# Patient Record
Sex: FEMALE | Race: WHITE | NOT HISPANIC OR LATINO | Employment: FULL TIME | URBAN - METROPOLITAN AREA
[De-identification: names, ages, dates, MRNs, and addresses within clinical notes are randomized per-mention and may not be internally consistent; named-entity substitution may affect disease eponyms.]

---

## 2017-12-12 ENCOUNTER — OFFICE VISIT - HEALTHEAST (OUTPATIENT)
Dept: VASCULAR SURGERY | Facility: CLINIC | Age: 42
End: 2017-12-12

## 2017-12-12 DIAGNOSIS — I87.2 VENOUS INSUFFICIENCY: ICD-10-CM

## 2017-12-12 DIAGNOSIS — I83.899 SYMPTOMATIC VARICOSE VEINS: ICD-10-CM

## 2017-12-12 RX ORDER — SPIRONOLACTONE 50 MG/1
50 TABLET, FILM COATED ORAL
Status: SHIPPED | COMMUNITY
Start: 2017-09-29

## 2017-12-12 RX ORDER — LORATADINE 10 MG/1
10 TABLET ORAL
Status: SHIPPED | COMMUNITY
Start: 2017-10-25

## 2017-12-12 RX ORDER — MULTIVITAMIN WITH IRON
500 TABLET ORAL
Status: SHIPPED | COMMUNITY
Start: 2015-03-25

## 2017-12-12 RX ORDER — POLYETHYLENE GLYCOL 3350 17 G/17G
POWDER, FOR SOLUTION ORAL
Status: SHIPPED | COMMUNITY
Start: 2017-08-16

## 2017-12-12 RX ORDER — BUPROPION HYDROCHLORIDE 150 MG/1
150 TABLET ORAL
Status: SHIPPED | COMMUNITY
Start: 2017-09-29

## 2020-11-18 ENCOUNTER — COMMUNICATION - HEALTHEAST (OUTPATIENT)
Dept: VASCULAR SURGERY | Facility: CLINIC | Age: 45
End: 2020-11-18

## 2020-12-01 ENCOUNTER — COMMUNICATION - HEALTHEAST (OUTPATIENT)
Dept: VASCULAR SURGERY | Facility: CLINIC | Age: 45
End: 2020-12-01

## 2020-12-01 ENCOUNTER — OFFICE VISIT - HEALTHEAST (OUTPATIENT)
Dept: VASCULAR SURGERY | Facility: CLINIC | Age: 45
End: 2020-12-01

## 2020-12-01 DIAGNOSIS — I83.893 VARICOSE VEINS OF BILATERAL LOWER EXTREMITIES WITH OTHER COMPLICATIONS: ICD-10-CM

## 2020-12-01 RX ORDER — CLONAZEPAM 0.5 MG/1
0.5 TABLET ORAL
Status: SHIPPED | COMMUNITY
Start: 2020-09-30

## 2020-12-11 ENCOUNTER — RECORDS - HEALTHEAST (OUTPATIENT)
Dept: VASCULAR ULTRASOUND | Facility: CLINIC | Age: 45
End: 2020-12-11

## 2020-12-11 DIAGNOSIS — I83.893 VARICOSE VEINS OF BILATERAL LOWER EXTREMITIES WITH OTHER COMPLICATIONS: ICD-10-CM

## 2020-12-15 ENCOUNTER — OFFICE VISIT - HEALTHEAST (OUTPATIENT)
Dept: VASCULAR SURGERY | Facility: CLINIC | Age: 45
End: 2020-12-15

## 2020-12-15 DIAGNOSIS — I83.893 VARICOSE VEINS OF BILATERAL LOWER EXTREMITIES WITH OTHER COMPLICATIONS: ICD-10-CM

## 2021-01-06 ENCOUNTER — COMMUNICATION - HEALTHEAST (OUTPATIENT)
Dept: VASCULAR SURGERY | Facility: CLINIC | Age: 46
End: 2021-01-06

## 2021-02-24 ENCOUNTER — COMMUNICATION - HEALTHEAST (OUTPATIENT)
Dept: VASCULAR SURGERY | Facility: CLINIC | Age: 46
End: 2021-02-24

## 2021-03-03 ENCOUNTER — RECORDS - HEALTHEAST (OUTPATIENT)
Dept: ADMINISTRATIVE | Facility: OTHER | Age: 46
End: 2021-03-03

## 2021-03-03 ENCOUNTER — RECORDS - HEALTHEAST (OUTPATIENT)
Dept: VASCULAR ULTRASOUND | Facility: CLINIC | Age: 46
End: 2021-03-03

## 2021-03-03 DIAGNOSIS — I83.893 VARICOSE VEINS OF BILATERAL LOWER EXTREMITIES WITH OTHER COMPLICATIONS: ICD-10-CM

## 2021-03-05 ENCOUNTER — RECORDS - HEALTHEAST (OUTPATIENT)
Dept: VASCULAR ULTRASOUND | Facility: CLINIC | Age: 46
End: 2021-03-05

## 2021-03-05 DIAGNOSIS — I83.893 VARICOSE VEINS OF BILATERAL LOWER EXTREMITIES WITH OTHER COMPLICATIONS: ICD-10-CM

## 2021-03-29 ENCOUNTER — COMMUNICATION - HEALTHEAST (OUTPATIENT)
Dept: VASCULAR SURGERY | Facility: CLINIC | Age: 46
End: 2021-03-29

## 2021-05-25 ENCOUNTER — RECORDS - HEALTHEAST (OUTPATIENT)
Dept: ADMINISTRATIVE | Facility: CLINIC | Age: 46
End: 2021-05-25

## 2021-05-27 ENCOUNTER — RECORDS - HEALTHEAST (OUTPATIENT)
Dept: ADMINISTRATIVE | Facility: CLINIC | Age: 46
End: 2021-05-27

## 2021-05-28 ENCOUNTER — RECORDS - HEALTHEAST (OUTPATIENT)
Dept: ADMINISTRATIVE | Facility: CLINIC | Age: 46
End: 2021-05-28

## 2021-05-29 ENCOUNTER — RECORDS - HEALTHEAST (OUTPATIENT)
Dept: ADMINISTRATIVE | Facility: CLINIC | Age: 46
End: 2021-05-29

## 2021-05-30 ENCOUNTER — RECORDS - HEALTHEAST (OUTPATIENT)
Dept: ADMINISTRATIVE | Facility: CLINIC | Age: 46
End: 2021-05-30

## 2021-05-31 VITALS — WEIGHT: 293 LBS | BODY MASS INDEX: 47.91 KG/M2

## 2021-06-01 ENCOUNTER — RECORDS - HEALTHEAST (OUTPATIENT)
Dept: ADMINISTRATIVE | Facility: CLINIC | Age: 46
End: 2021-06-01

## 2021-06-01 ENCOUNTER — OFFICE VISIT - HEALTHEAST (OUTPATIENT)
Dept: VASCULAR SURGERY | Facility: CLINIC | Age: 46
End: 2021-06-01

## 2021-06-01 DIAGNOSIS — I83.893 VARICOSE VEINS OF BILATERAL LOWER EXTREMITIES WITH OTHER COMPLICATIONS: ICD-10-CM

## 2021-06-01 DIAGNOSIS — I87.2 VENOUS INSUFFICIENCY OF LEFT LEG: ICD-10-CM

## 2021-06-02 ENCOUNTER — RECORDS - HEALTHEAST (OUTPATIENT)
Dept: ADMINISTRATIVE | Facility: CLINIC | Age: 46
End: 2021-06-02

## 2021-06-13 NOTE — TELEPHONE ENCOUNTER
"Patient called in left message she is having \"really bad\" right calf pain. She is seeing Dr. Tovar on 12/1/20 and wants to know if he has any recommendations. She last saw him in 2017.    Discussed with vascular manager Lissa, called patient back and left message that the patient should contact her PCP right away to get the calf pain checked out. Asked her to call with any other questions.  "

## 2021-06-13 NOTE — PROGRESS NOTES
"Danielle Grant is being evaluated via a billable video visit.      The patient has been notified of following:     \"This video visit will be conducted via a call between you and your physician/provider. We have found that certain health care needs can be provided without the need for an in-person physical exam. This service lets us provide the care you need with a video conversation. If a prescription is necessary we can send it directly to your pharmacy. If lab work is needed we can place an order and you can make an appointment with our lab at a later time.    Video visits are billed at different rates depending on your insurance coverage. Please reach out to your insurance provider with any questions.    If during the course of the call the physician/provider feels a video visit is not appropriate, you will not be charged for this service.\"    Patient has given verbal consent to a Video visit? Yes  How would you like to obtain your AVS? AVS Preference: Mail a copy.  If dropped by the video visit, the video invitation should be sent to: Text to cell phone: [phone]  Will anyone else be joining your video visit? No        Video-Visit Details    Type of service:  Video Visit    Originating Location (pt. Location): Home    Distant Location (provider location):  [facility]     Platform used for Video Visit: Zhanna Hilario LPN      "

## 2021-06-13 NOTE — PROGRESS NOTES
"Danielle Grant is being evaluated via a billable video visit.      The patient has been notified of following:     \"This video visit will be conducted via a call between you and your physician/provider. We have found that certain health care needs can be provided without the need for an in-person physical exam. This service lets us provide the care you need with a video conversation. If a prescription is necessary we can send it directly to your pharmacy. If lab work is needed we can place an order and you can make an appointment with our lab at a later time.    Video visits are billed at different rates depending on your insurance coverage. Please reach out to your insurance provider with any questions.    If during the course of the call the physician/provider feels a video visit is not appropriate, you will not be charged for this service.\"    Patient has given verbal consent to a Video visit? Yes  How would you like to obtain your AVS? AVS Preference: Mail a copy.  If dropped by the video visit, the video invitation should be sent to: Text to cell phone: [phone]  Will anyone else be joining your video visit? No    Current symptoms: bilateral varicose veins, c/o cramping and swelling.    Compression socks: No        Video-Visit Details    Type of service:  Video Visit    Originating Location (pt. Location): Home    Distant Location (provider location):  [facility]     Platform used for Video Visit: En Hilario LPN    "

## 2021-06-13 NOTE — TELEPHONE ENCOUNTER
Patient is calling to set up her ultrasound.  Writer noticed the ultrasound and 3 month follow has been scheduled in March.  However patient thought she would have the ultrasound done sooner to find out the source of the pain.  She would like clarification on this.

## 2021-06-14 NOTE — PROGRESS NOTES
Bilateral toes turn red/blue/purple and cool to touch. Is not wearing compression. VV continue left leg.Plan get new compression and wear them.

## 2021-06-14 NOTE — TELEPHONE ENCOUNTER
Patient would like updated on where things stand with prior authorization for procedure with Dr. Tovar.

## 2021-06-14 NOTE — TELEPHONE ENCOUNTER
Pt called back with insurance information.     [employer]- [id number]. Group- [id number]  [facility][id number]. Group-[id number]

## 2021-06-14 NOTE — TELEPHONE ENCOUNTER
Called patient and left message to let her know we received insurance authorization today. She should be expecting a call from our schedulers within the next couple of days.

## 2021-06-14 NOTE — PROGRESS NOTES
HPI: Pt is here for follow up of a vein disease in the leg issues.  She states that she has some cool purple toes occasionally on both feet she notices this during the fall and winter.  She is had extensive venous disease and had closure in the past.  She has been wearing socks of lately comes in for counter reevaluation again today.  She does have symptoms of pain though she gets some kind of chafing on her medial thighs which she is also points out.  She is doing well. Her appetite is good, and bowel function regular.  No fevers or chills. Ambulating without problems.       /66  Pulse 86  Temp 97.6  F (36.4  C) (Oral)   Resp 14  Wt (!) 324 lb 6.4 oz (147.1 kg)  BMI 47.91 kg/m2      EXAM: This is a  42 y.o. WOMAN in no distress  GENERAL: Appears well  CHEST clear  CVS S1S2 NSR  ABDOMEN: Soft, non-tender.   EXT: warm, moves without difficulty, she has some to be chafing count of issues going on medial thighs probably from her legs rubbing against each other.  She has got great dopplerable pulses with triphasic pulses very heard today.  No large varicosities are noted she does have spider veins both lower legs.      Assessment/Plan:   1. Symptomatic varicose veins  I think that she should continue her socks to get a new prescription for her today she her old ones are worn out she has not been wearing them at all.  - Compression stockings    2. Venous insufficiency  Emergent arterial issues I do not think she has an original problem I think she has more of kind of a spasm issue we could put her on Norvasc but I think it has been of extreme for her I think more about wearing socks warm her feet up and get her better results stay active also discussed weight loss exercise.  - Compression stockings      Too Tovar MD  [facility] Department of Surgery

## 2021-06-15 NOTE — TELEPHONE ENCOUNTER
Spoke with patient regarding vein ablation procedure next week in [place]. Advised to bring a . Explained it is ok to eat and drink prior to procedure with exception of blood thinner. Verified patient's insurance [employer] primary/[employer] secondary . We will supply patient with a thigh high compression sock. We carry from size medium to extra large.patient will bring in her own compression garment If patient doesn't fit into them they will need to provide their own. Questions answered. Patient will call if any further questions.

## 2021-06-15 NOTE — PROGRESS NOTES
Venaseal    Pre-Procedure:  Admit  [x]Consent for Venaseal Ablation of the   []Right Saphenous Vein and/or branches  [x]Left Saphenous Vein and/or branches  Vitals  [x]Vital signs per routine    Nursing Orders  [x]Vein Mapping of  []R extremity  [x]L extremity  [x]Sterile Prep to extremity    Medications  []Diazepam (Valium) 5mg PO, May Repeat x 1 for anxiety, relaxtion.    Post-Procedure:  Admission  [x]Post Procedure care - call physician for status update  []Admit to inpatient - Please document reason for admission in chart    Interventions require inpatient services     High risk of deterioration due to comorbidities     High risk of deterioration due to nature of admitting diagnosis  Location:    Vitals  [x]Vital signs per routine    Nursing Orders  [x]Thigh High Compression Stocking 20-30mm or 30-40mm to  []R extremity  [x]L extremity  [x]Check insertion site for bleeding or hematoma.  If bleeding or hematoma occurs, apply pressure and notify MD    Discharge  [x]Discharge home if no complications arise.  [x]Give post Venaseal Ablation discharge instructions to patient.  [x]Follow up venous ultrasound 72-96 hours after procedure.  [x]Follow up appointment with MD at 2 weeks.  [x]Contact MD for further questions

## 2021-06-15 NOTE — PATIENT INSTRUCTIONS - HE
Home Care Instructions Following Radiofrequency Closure of the Greater Saphenous Vein (VNUS)    Dressing  Wear your compression for 48 hours continuously until your ultrasound after the procedure.  You can remove your stocking to shower in 24 hours but then reapply after.  Wear the stocking for two weeks after the procedure while you are up.    Activity  The day of the procedure make sure to walk around the house for a few minutes each hour until bedtime.  The day after the procedure you should advance activity as tolerated.  NO strenuous activities though for 2 weeks.  In general avoid prolonged standing in place and sitting with your legs down.  Both of these activities will cause blood to pool in your legs resulting in more swelling and discomfort.  Do not drive or operate heavy machinery for 24 hours after the procedure.  Do not take baths or use hot tubs or swimming pools until your incision site is healed.  Do not fly for the Next 3 weeks.    Post Procedure Ultrasound  You will need an ultrasound within 2-3 days following the closure procedure.  Please schedule an ultrasound if you have not already done so.    Post Procedure Signs and Symptoms  There can be a mild amount of bruising and numbness after this procedure.  This will typically take a few weeks to fade.  You may feel pain or tenderness in your inner thigh.  Mild pain medication such as Tylenol or Ibuprofen maybe helpful.  It is normal to have fluid leaking from the injection areas the day of the procedure and it may be blood tinged.    Call if you have  Fever greater than 100.8 degrees F.  Uncontrolled bleeding from the incision site.  Pain that is not relieved by rest or pain medication.  Shortness of breath    Follow -up  Please plan to see your surgeon in the office two weeks after your procedure  Call [phone] to schedule this appointment if one has not already been made    You will receive a phone call from our staff within 48 hours of  your procedure.  Please have these instruction near by so that any questions can be addressed at that time.  If you have any concerns before that time, please do not hesitate to call us al [phone] and ask to speak to a nurse.  We want you to have a smooth recovery.    For emergencies after 4:30pm Monday - Friday, holidays and weekends:  For Dr Too Tovar call [facility] at [phone]  [facility]  [place] [phone]

## 2021-06-16 NOTE — TELEPHONE ENCOUNTER
Spoke with patient aware ok to use knee high compression,and return to exercise. No flights for 3 weeks.

## 2021-06-16 NOTE — TELEPHONE ENCOUNTER
Pt has been rescheduled for a f/u in June with Dr. Tovar from an appt she missed 3/23/21. Pt would like nurse Engle to call her back regarding compression stockings and working out. Pt was informed nurse Engle is not in the office today. Pt stated she understood.

## 2021-06-17 NOTE — TELEPHONE ENCOUNTER
Telephone Encounter by Dennise Orozco at 12/1/2020  9:51 AM     Author: Dennise Orozco Service: -- Author Type: Patient Access    Filed: 12/1/2020  9:52 AM Encounter Date: 12/1/2020 Status: Signed    : Dennise Orozco (Patient Access)

## 2021-06-18 NOTE — PATIENT INSTRUCTIONS - HE
"Patient Instructions by Mariah Hilario LPN at 12/1/2020 10:00 AM     Author: Mariah Hilario LPN Service: -- Author Type: Licensed Nurse    Filed: 12/1/2020 10:06 AM Encounter Date: 12/1/2020 Status: Signed    : Mariah Hilario LPN (Licensed Nurse)       We are prescribing some compression stockings for you. I have included different suppliers that should help you get measured and fitting to ensure proper fitting socks. You should wear this socks as much as you can. It is especially important to wear them with long periods of sitting/standing, long car rides or if you will be flying. Compression socks should get refilled every 4-6 months. They do not need to be worn at night while in bed.    If you do a lot of standing it is good to do calf raises to help keep the blood pumping. If you sit a lot at work it is good to get up periodically to walk around. Elevation of the foot of your bed 4-6\" helps the blood return back to where it is needed.    Varicose Veins      Varicose veins are swollen, enlarged veins most often found in the legs. They are usually blue or purple in color and may bulge, twist, and stand out under the skin.  Normally, veins return blood from the body to the heart. The leg veins have one-way valves that prevent blood from flowing backward in the vein. When the valves are weak or damaged, blood backs up in the veins. This may cause some of the veins to swell and bulge and become varicose veins.  Symptoms  Varicose veins may or may not cause symptoms. If symptoms do occur, they can include:    Legs that feel tired, achy, heavy, or itchy    Leg muscle cramps    Skin changes, such as discoloration, dryness, redness, or rash (in more severe cases, you may also have sores on the skin called venous leg ulcers)  Risk Factors  There are a number of factors that increase the risk for varicose veins. These can include:    Being a woman    Being older    Sitting or standing for long " periods    Being overweight    Being pregnant    Having a family history of varicose veins  Treatment begins with simple self-help measures (see below). If these dont help, there are many procedures that can be done to shrink or remove varicose veins. Your healthcare provider can tell you more about these options, if needed.  Home care    Support or compression stockings will likely be prescribed. If so, be sure to wear them as directed. They may help improve blood flow.    Exercising helps strengthen your leg muscles and improve blood flow. To get the most benefit, choose exercises such as walking, swimming, or cycling. Also try to exercise for at least 30 minutes on most days.    Raising (elevating) your legs lets gravity help blood flow back to the heart. Sit or lie with your feet above heart level a few times throughout the day, or as directed.    Avoid long periods of sitting or standing. Change positions often. Also, move your ankles, toes and knees often. This may also help improve blood flow.    If you are overweight, talk with your healthcare provider about setting up a weight-loss plan. Maintaining a healthy weight can help reduce the strain on your veins. It may also improve symptoms, such as swelling and aching.    If you have dryness and itching, ask your provider about special lotions that can be applied to the skin to help improve symptoms.  Follow-up care  Follow up with your healthcare provider, or as directed. If imaging tests were done, youll be told the results and if there are any new findings that affect your care.  When to seek medical advice  Call your healthcare provider right away if any of these occur:    Sudden, severe leg swelling, pain, or redness    Symptoms worsen, or they dont improve with self-care    Bleeding from any affected veins    Ulcers form on the legs, ankles, or feet    Fever of 100.4 F (38 C) or higher, or as advised by your provider      Understanding Spider and Varicose  Veins  Do you often hide your legs because of the way they look? You may have noticed tiny red or blue bursts (spider veins). Or maybe you have veins that bulge or look twisted (varicose veins). If so, there are treatments that can help  What are the symptoms?  Spider veins or varicose veins may never be a problem. But sometimes they can cause legs to ache or swell. Your legs may also feel heavy and tired, or like theyre burning. These symptoms may be more severe at the end of the day. Prolonged sitting or standing can also make your symptoms worse.  Who gets spider and varicose veins?  Anyone can get spider or varicose veins. But vein problems tend to be hereditary (run in families). Other factors that can affect veins include:    Pregnancy, hormones, and birth control pills    A job where you stand or sit a lot    Extra weight or lack of exercise    Age         Spider veins look like tiny webs on the ankles, legs, and upper thighs.       Ropy, dark blue, red, or flesh-colored varicose veins are most common on the thighs, calves, and feet.    What can be done?  Spider and varicose veins can affect the way you feel about yourself. Talk to your healthcare provider about your concerns. There are treatments that can ease symptoms and make your legs look better.  Your treatment choices  Treatment may include self-care, sclerotherapy (injecting veins with a chemical), surgery, or newer nonsurgical minimally invasive therapies. Spider veins and some varicose veins can be treated with sclerotherapy. Large varicose veins can often be treated with newer minimally invasive procedures and, in rare cases, surgery may be needed.     Please call [facility] to schedule an appointment. If you received a prescription please bring it with you to your appointment. You may call one of the locations below, although some locations are limited to what they carry.    Office Locations  New Locations  [facility]  [place]  [facility] Medical Equipment  [address], [place] [address]  [facility] (Formerly named [facility])  [address], [place] [address]  Phone [phone] /Fax [phone]        [place]   [address]   Medical Equipment [address]/Orthotics and Prosthetics [address]  [place] [address]   Phone: [phone]  Fax [phone]    [facility]  [address]  [address]  [place] [address]  Phone: [phone]  Fax: [phone]    [facility] Bldg[facility]   [address]  [place] [address]  Phone: [phone]  Fax: [phone]    [facility] Bldg[facility]  [address]  [place] [address]  Phone: [phone]  Fax: [phone]    [facility]  [address]  [address]  [place] [address]  Phone: [phone]  Fax: [phone]    [facility] [employer]  [address]   [place] [address]   Phone: [phone]  Fax: [phone]    [place]   [address] [place] [address]   Phone: [phone]  Fax: [phone]    [facility]  [address]  [place] [address]    [place] [phone]  [phone]  [place] [phone]  [email or web address]      [facility]   [address]             [address]                 [address]     [place] [address]      [place] [address]         [place] [address]  [phone]  [phone]     [phone]               Fax: [phone]  [email or web address]                                                     [facility]  [address] [place]  [address]  [phone]  [phone]  [email or web address]    [place] [facility]  [phone]  [email or web address]    [facility]   [phone]    [place] [address] [place] [address] [phone]

## 2021-06-18 NOTE — PATIENT INSTRUCTIONS - HE
Patient Instructions by Too Tovar MD at 12/15/2020  9:20 AM     Author: Too Tovar MD Service: -- Author Type: Physician    Filed: 12/15/2020  9:45 AM Encounter Date: 12/15/2020 Status: Signed    : Too Tovar MD (Physician)         Varicose Vein Pre-Procedure Instructions/Vein Ablation    You are scheduled for a varicose vein treatment on your legs. The following is some helpful information for you, in regards to your treatment.    **Important:  A  will be needed post procedure.  (Unable to use Taxi or Uber).     We will supply a thigh high compression stocking for you. Please bring your compression sock as we only have sizes medium to X-large.    Please be aware, it is not advised to fly within 3 weeks post procedure    Please wear comfortable clothing.  We recommend that you bring a change of under clothes; they may get stained by the cleansing solution.    Feel free to bring a personal music player or a CD to listen to during your procedure.    Take your routine medications as you normally would with exception to blood thinners. Aspirin is ok to continue.    If you take Warfarin, Xarelto, or Eliquis this will need to be HELD prior to procedure according to primary care provider/doctor or cardiology who prescribes this medication.    Please notify us if you take this medication.    It is ok to eat prior to this procedure.    Please allow 1- 2 hours for your appointment.    For any questions regarding your procedure please call   [phone] to speak with the nurse.    If you would like a Good Lucretia Estimate for your upcoming service/procedure contact Cost of Care Estimates at [phone], advocates are available Monday through Friday 8am - 5pm.    VenaSeal Ablation Codes  75264 for first vein in either leg  36186 for the second vein in the same leg    Please have the following information available:  1. Patient name and date of birth  2. Insurance company, plan name, ID and  group numbers  3. Description of the service/procedure and the associated procedure code numbers, if available. If more than one (1) procedure code, indicate which will be the primary procedure code.   4. The facility where the service/procedure will be performed.  5. The name of the physician involved with the service/procedure.  6. Appointment date of service.  7. Telephone number to call with the information.  Varicose Veins    UNDERSTAND  Varicose veins may be a sign of something more severe-venous reflux disease.  Healthy leg veins have valves that keep blood flowing to the heart. Venous reflux disease develops when the valves stop working properly and allow blood to flow backward (i.e., reflux) and pool in the lower leg veins.     If venous reflux disease is left untreated, symptoms can worsen over time. Your doctor can help you understand if you have this condition.     Superficial venous reflux disease may cause the following signs and symptoms in your legs:  Varicose veins  Aching  Swelling  Cramping  Heaviness or tiredness  Itching  Restlessness  Open skin sores    Treat  Superficial venous reflux disease treatment aims to reduce or stop the backward flow of blood. The following may be prescribed to treat your superficial venous reflux disease. Your doctor can help you decide which treatment is best for you:       Compression stockings     Removing diseased vein     Closing diseased vein (through thermal or non-thermal treatment)     VenaSeal? Closure System  One non-thermal treatment option is the VenaSeal? Closure System, which improves blood flow and relieves symptoms by sealing-or closing-the diseased vein. The system delivers a small amount of a specially formulated medical adhesive to the diseased vein. The adhesive permanently seals the vein and blood is rerouted through nearby healthy veins.          Demonstrated Outcomes  The VenaSeal? closure system is a safe and effective treatment, providing  significant improvements in quality of life.1,2,3    In a [place] study , the VenaSeal? system and thermal radiofrequency ablation treatments had similar clinical results at 3 years; 94.4% closure for the VenaSeal? system and 91.9% for thermal energy.     Side effects were minor and infrequent.     The most common side effect was phlebitis (i.e., inflammation of a vein), and it typically occurred within the first 30 days after the procedure. Phlebitis is a commonly reported side effect in all vein treatments. Phlebitis occurred more frequently in VenaSeal? system-treated subjects than in RFA-treated subjects, though the difference was not statistically significant.     Please see the Potential risks section for more information.    FAQ  What can I expect of the VenaSeal? procedure?    Before the Procedure:  You will have an ultrasound imaging exam of the leg that is to be treated. This exam is important for assessing the diseased superficial vein and planning the procedure.    During the Procedure:  Your doctor can discuss the procedure with you. A brief summary of what to expect is below:  You may feel some minor pain or stinging with a needle stick to numb the site where the doctor will access your vein.  Once the area is numb, your doctor will insert the catheter (i.e., a small hollow tube) into your leg. You may feel some pressure from the placement of the catheter.  The catheter will be placed in specific areas along the diseased vein to deliver small amounts of the medical adhesive. You may feel some mild sensation of pulling. Ultrasound will be used during the procedure to guide and position the catheter.  After treatment, the catheter is removed and a small adhesive bandage placed over the puncture site.         After the Procedure:  You will be taken to the recovery area to rest.  Your doctor will recommend follow-up care as needed.    Commonly Asked Questions  When will my symptoms improve?  Symptoms are caused  by the diseased superficial vein. Thus, symptoms may improve as soon as the diseased vein is closed.  When can I return to normal activity?  The VenaSeal procedure is designed to reduce recovery time. Many patients return to normal activity immediately after the procedure. Your doctor can help you determine when you can return to normal activity.  Is the VenaSeal procedure painful?  Most patients feel little, if any, pain during the outpatient procedure.1  Is there bruising after the VenaSeal? procedure?  Most patients report [id number] bruising after the VenaSeal procedure.1  What happens to the VenaSeal? adhesive?  Only a very small amount of VenaSeal? adhesive is used to close the vein. Your body will naturally create scar tissue around the adhesive over time to keep the vessel permanently closed.  How does the VenaSeal? procedure differ from thermal energy procedures?  The VenaSeal? procedure uses an adhesive to close the superficial vein. Thermal energy procedures use heat to close the vein. The intense heat requires a large volume of numbing medicine, which is injected through many needle sticks. The injections may cause pain and bruising after the procedure.  Is the VenaSeal procedure covered by insurance?  As with any procedure, insurance coverage may vary. For more information, please contact your insurance provider.    The VenaSeal? procedure may not be right for everyone  Your doctor can help you decide if the VenaSeal? procedure is right for you. The VenaSeal? procedure is contraindicated for individuals with any of the following conditions:  Thrombophlebitis migraines (i.e., inflammation of a vein caused by a slow moving blood clot)  Acute superficial thrombophlebitis (i.e., inflammation of a vein caused by a blood clot)  Previous hypersensitivity reactions to the VenaSeal? adhesive or cyanoacrylates  Acute sepsis (i.e., whole-body inflammation caused by an immune response to an  infection)    Potential risks  The VenaSeal? procedure is minimally invasive and catheter-based. As such, it may involve the following risks. Your doctor can help you understand these risks.  Allergic reaction to the VenaSeal? adhesive  Arteriovenous fistula (i.e., an abnormal connection between an artery and a vein)  Bleeding from the access site  Deep vein thrombosis (i.e., blood clot in the deep vein system)  Edema (i.e., swelling) in the treated leg  Embolization (i.e., blockage of a vein or artery), including pulmonary embolism (i.e., blockage of an artery in the lungs)  Hematoma (i.e., the collection of blood outside of a vessel)  Hyperpigmentation (i.e., darkening of the skin)  Infection at the access site  Non-specific mild inflammation of the cutaneous and subcutaneous tissue  Pain  Paresthesia (i.e., a feeling of tingling, pricking, numbness or burning)  Phlebitis (i.e., inflammation of a vein)  Superficial thrombophlebitis (i.e., inflammation of a vein caused by a blood clot)  Urticaria (i.e., hives) or ulceration may occur at the site of injection  Vascular rupture and perforation  Visible scarring

## 2021-06-21 NOTE — LETTER
Letter by Too Tovar MD at      Author: Too Tovar MD Service: -- Author Type: --    Filed:  Encounter Date: 12/1/2020 Status: (Other)         Yemi Lozoya MD  [address] [facility] [address]                                  December 1, 2020    Patient: Danielle Grant   MR Number: [id number]   YOB: 1975   Date of Visit: 12/1/2020     Dear Dr. Devora MD:    Thank you for referring Danielle Grant to me for evaluation. Below are the relevant portions of my assessment and plan of care.    If you have questions, please do not hesitate to call me. I look forward to following Danielle along with you.    Sincerely,        Too Tovar MD            Too Montgomery MD  12/1/2020  1:42 PM  Sign when Signing Visit      [facility] Vein Consult  Video Visit  Start: 10:04  End: 10:19      Assessment:     1. varicose veins, bilateral   2. spider veins, bilateral   3. Leg pain and swelling bilateral    Plan:     1. Treatment options of conservative therapy of stockings use, exercise, weight loss, elevating legs when possible.    2. Script for compression stockings 20-30 mm hg  3. Ultrasound to evaluate legs for incompetency of both deep and superficial system .   4. Surgical treatment, discussed briefly  5. Follow up: after ultrasound virtual or in person or tlelphone.   6. Call for any questions concerns or issues    Subjective:      Danielle Grant is a 45 y.o. female  who was referred by Yemi Lozoya MD  for evaluation of varicose veins. Symptoms include pain, aching, fatigue, burning, edema and dermatitis. Patient has history of leg selling, pain and vein issues that have progressed. Pain and symptoms have affected daily living and work activities needing medications. Here for evaluation today. no stocking or compression devic use    Allergies:Gluten, Pepper (genus capsicum), Strawberry, and Latex    Past Medical History:    Diagnosis Date   ? Allergic Rhinitis     Created by Conversion    ? Anxiety    ? Asthma    ? Excessive Facial Or Body Hair (Hirsutism)     Created by Conversion    ? Foot Pain (Soft Tissue)     Created by Conversion    ? Ganglion     Created by Conversion    ? Hypertension     Created by Conversion    ? Insomnia     Created by Conversion    ? Major Depression, Recurrent     Created by Conversion    ? Migraine Headache     Created by Conversion    ? Nephrolithiasis     Created by Conversion    ? Obesity     Created by Conversion    ? Obstructive Sleep Apnea     Created by Conversion    ? Oligomenorrhea     Created by Conversion    ? Seborrheic Dermatitis     Created by Conversion    ? Sleep apnea    ? Type 2 Diabetes Mellitus     Created by Conversion    ? Vitamin D Deficiency     Created by Conversion        Past Surgical History:   Procedure Laterality Date   ? HYSTERECTOMY Bilateral 2016    Procedure: TOTAL LAPAROSCOPIC HYSTERECTOMY BILATERAL SALPINGECTOMY AND CYSTOSCOPY;  Surgeon: Juliana Santana MD;  Location: [facility] Main OR;  Service:    ? NC  DELIVERY ONLY      Description:  Section;  Recorded: 2008;   ? NC LIGATE FALLOPIAN TUBE      Description: Tubal Ligation;  Recorded: 2008;   ? NC TOTAL ABDOM HYSTERECTOMY N/A 2016    Procedure: ABDOMINAL SUBTOTAL  HYSTERECTOMY, ATTEMPTED LAPAROSCOPIC and MIRENA REMOVAL;  Surgeon: Juliana Santana MD;  Location: [facility];  Service: Gynecology       Current Outpatient Medications   Medication Sig Note   ? amitriptyline (ELAVIL) 150 MG tablet Take 150 mg by mouth bedtime.  2015: Received from: External Pharmacy   ? aspirin 81 mg chewable tablet Chew 81 mg daily.    ? buPROPion (WELLBUTRIN XL) 150 MG 24 hr tablet Take 150 mg by mouth. 2017: Received from: [facility] Received Sig: Take 1 tablet by mouth once daily.   ? cholecalciferol, vitamin D3, (VITAMIN D3) 1,000 unit capsule  Take 2,000 Units by mouth bedtime.     ? clonazePAM (KLONOPIN) 0.5 MG tablet Take 0.5 mg by mouth.    ? CONTOUR NEXT STRIPS Strp TEST BLOOD SUGARS 3 TIMES DAILY    ? dulaglutide (TRULICITY) 1.5 mg/0.5 mL PnIj Inject 1.5 mg under the skin. 12/12/2017: Received from: [facility] Received Sig: Inject 1.5 mg subcutaneous once weekly.   ? DULoxetine (CYMBALTA) 20 MG capsule Take 60 mg by mouth bedtime.     ? EASY TOUCH TWIST LANCETS 30 gauge Misc  1/14/2015: Received from: External Pharmacy   ? eletriptan (RELPAX) 20 MG tablet Take 2 tablets (40 mg total) by mouth as needed for migraine. may repeat in 2 hours if necessary    ? fluticasone (FLONASE) 50 mcg/actuation nasal spray 1 spray into each nostril. 12/12/2017: Received from: [facility] Received Sig: Inhale 1 Spray in the nostril(s) once daily.   ? loratadine (CLARITIN) 10 mg tablet Take 10 mg by mouth. 12/12/2017: Received from: [facility] Received Sig: Take 1 tablet by mouth once a day   ? magnesium 250 mg Tab Take 500 mg by mouth. 12/12/2017: [facility] Received Sig: Take 2 tablets by mouth once daily.   ? magnesium oxide 250 mg Tab Take 250 mg by mouth bedtime.     ? metFORMIN (GLUCOPHAGE-XR) 500 MG 24 hr tablet Take 2,000 mg by mouth bedtime.    ? montelukast (SINGULAIR) 10 mg tablet Take 10 mg by mouth bedtime.    ? orphenadrine (NORFLEX) 100 mg tablet Take 100 mg by mouth bedtime as needed for muscle spasms.    ? polyethylene glycol (GLYCOLAX) 17 gram/dose powder  12/12/2017: Received from: [facility]   ? senna-docusate (PERICOLACE) 8.6-50 mg tablet Take 1 tablet by mouth 2 (two) times a day.    ? spironolactone (ALDACTONE) 50 MG tablet Take 50 mg by mouth. 12/12/2017: Received from: [facility] Received Sig: Take 1 tablet by mouth 2 times daily.        Family History   Problem Relation Age of Onset   ? Varicose Veins Mother         reports that she has never smoked. She does not have any smokeless tobacco history on file. She reports current alcohol use. She reports that she does not use drugs.      Review of Systems:  Pertinent items are noted in HPI.  A 12 point comprehensive review of systems was negative except as noted.   Patient has symptomatic veins and changes of bilateral legs. These have progressed to the point of causing symptoms on a daily basis. This causes issues with daily activities and chores such as washing dishes, vacuuming, outdoor upkeep and standing for long lengths of time       Objective:     There were no vitals filed for this visit.  There is no height or weight on file to calculate BMI.    EXAM:  GENERAL: This is a well-developed 45 y.o. female who appears her stated age  HEAD: normocephalic  VASCULAR: Pulses intact, symmetrical upper and lower extremities. There areskin changes consistent with chronic venous insufficiency. Varicose veins present in bilateral greater saphenous distribution. Spider veins present bilateral   From photos sent in.    The rest of the exam not done secondary to covid n19 restrictions.                              Imaging:    pending    Too Tovar MD  General Surgery [phone]  Vascular Surgery [phone]

## 2021-06-21 NOTE — LETTER
Letter by Too Tovar MD at      Author: Too Tovar MD Service: -- Author Type: --    Filed:  Encounter Date: 12/15/2020 Status: (Other)         Yemi Lozoya MD  [address] [facility] [address]                                  December 28, 2020    Patient: Danielle Grant   MR Number: [id number]   YOB: 1975   Date of Visit: 12/15/2020     Dear Dr. Devora MD:    Thank you for referring Danielle Grant to me for evaluation. Below are the relevant portions of my assessment and plan of care.    If you have questions, please do not hesitate to call me. I look forward to following Danielle along with you.    Sincerely,        Too Tovar MD            Too Montgomery MD  12/28/2020  1:04 PM  Sign when Signing Visit  Follow Up: Varicose Veins/ Venous Insufficiency    Danielle Grant is a 45 y.o.  female here for followup. She has worn stockings now for 3 months. I saw them previously in December 2020.  Continued progression of disease and symptoms and issues; reviewed ultrasound results. Patient has ongoing symptoms with pain and swelling needing intervention with pain meds secondary to interfering with daily activities and work. This now inhibits daily chores and activities.     Allergies:Gluten, Pepper (genus capsicum), Strawberry, and Latex    Past Medical History:   Diagnosis Date   ? Allergic Rhinitis     Created by Conversion    ? Anxiety    ? Asthma    ? Excessive Facial Or Body Hair (Hirsutism)     Created by Conversion    ? Foot Pain (Soft Tissue)     Created by Conversion    ? Ganglion     Created by Conversion    ? Hypertension     Created by Conversion    ? Insomnia     Created by Conversion    ? Major Depression, Recurrent     Created by Conversion    ? Migraine Headache     Created by Conversion    ? Nephrolithiasis     Created by Conversion    ? Obesity     Created by Conversion    ? Obstructive Sleep Apnea     Created by  Conversion    ? Oligomenorrhea     Created by Conversion    ? Seborrheic Dermatitis     Created by Conversion    ? Sleep apnea    ? Type 2 Diabetes Mellitus     Created by Conversion    ? Vitamin D Deficiency     Created by Conversion        Past Surgical History:   Procedure Laterality Date   ? HYSTERECTOMY Bilateral 2016    Procedure: TOTAL LAPAROSCOPIC HYSTERECTOMY BILATERAL SALPINGECTOMY AND CYSTOSCOPY;  Surgeon: Juliana Santana MD;  Location: [facility];  Service:    ? [place]  DELIVERY ONLY      Description:  Section;  Recorded: 2008;   ? [place] LIGATE FALLOPIAN TUBE      Description: Tubal Ligation;  Recorded: 2008;   ? [place] TOTAL ABDOM HYSTERECTOMY N/A 2016    Procedure: ABDOMINAL SUBTOTAL  HYSTERECTOMY, ATTEMPTED LAPAROSCOPIC and MIRENA REMOVAL;  Surgeon: Juliana Santana MD;  Location: [facility] OR;  Service: Gynecology       CURRENT MEDS:  Current Outpatient Medications on File Prior to Visit   Medication Sig Dispense Refill   ? amitriptyline (ELAVIL) 150 MG tablet Take 150 mg by mouth bedtime.      ? aspirin 81 mg chewable tablet Chew 81 mg daily.     ? buPROPion (WELLBUTRIN XL) 150 MG 24 hr tablet Take 150 mg by mouth.     ? cholecalciferol, vitamin D3, (VITAMIN D3) 1,000 unit capsule Take 2,000 Units by mouth bedtime.      ? clonazePAM (KLONOPIN) 0.5 MG tablet Take 0.5 mg by mouth.     ? CONTOUR NEXT STRIPS Strp TEST BLOOD SUGARS 3 TIMES DAILY 100 strip PRN   ? dulaglutide (TRULICITY) 1.5 mg/0.5 mL PnIj Inject 1.5 mg under the skin.     ? DULoxetine (CYMBALTA) 20 MG capsule Take 60 mg by mouth bedtime.      ? EASY TOUCH TWIST LANCETS 30 gauge Misc      ? eletriptan (RELPAX) 20 MG tablet Take 2 tablets (40 mg total) by mouth as needed for migraine. may repeat in 2 hours if necessary 10 tablet 1   ? fluticasone (FLONASE) 50 mcg/actuation nasal spray 1 spray into each nostril.     ? loratadine (CLARITIN) 10 mg tablet Take 10 mg by mouth.     ? magnesium 250 mg Tab  Take 500 mg by mouth.     ? magnesium oxide 250 mg Tab Take 250 mg by mouth bedtime.      ? metFORMIN (GLUCOPHAGE-XR) 500 MG 24 hr tablet Take 2,000 mg by mouth bedtime.     ? montelukast (SINGULAIR) 10 mg tablet Take 10 mg by mouth bedtime.     ? orphenadrine (NORFLEX) 100 mg tablet Take 100 mg by mouth bedtime as needed for muscle spasms.     ? polyethylene glycol (GLYCOLAX) 17 gram/dose powder      ? senna-docusate (PERICOLACE) 8.6-50 mg tablet Take 1 tablet by mouth 2 (two) times a day. 30 tablet 0   ? spironolactone (ALDACTONE) 50 MG tablet Take 50 mg by mouth.       No current facility-administered medications on file prior to visit.        Family History   Problem Relation Age of Onset   ? Varicose Veins Mother         reports that she has never smoked. She does not have any smokeless tobacco history on file. She reports current alcohol use. She reports that she does not use drugs.    Review of Systems:  Negative except continued symptoms and aissues  Patient has symptomatic veins and changes of bilateral legs. These have progressed to the point of causing symptoms on a daily basis. This causes issues with daily activities and chores such as washing dishes, vacuuming, outdoor upkeep and standing for long lengths of time. She has worn compression now for greater than 3 months, worked on an exercise and weight loss program and continues to have symptoms.     OBJECTIVE:  There were no vitals filed for this visit.  There is no height or weight on file to calculate BMI.    EXAM:  GENERAL: This is a well-developed 45 y.o. female who appears her stated age  HEAD: normocephalic    The rest of the exam not done secondary to video visit and covid n19 restrictions.                  Imaging:    US Venous Insufficiency Legs Bilateral (Order [id number])  Imaging  Date: 12/11/2020 Department: [facility] [place] Released By: Ida Melendez Authorizing: Too Tovar MD   Study  Result    BILATERAL Venous Insufficiency Ultrasound     BILATERAL Lower Extremity          Indication:  Surveillance of bilateral leg pain vv and swelling, HX of Right GSV 2015        Patient History: Swelling, Stasis, Vein Stripping and Morbid Obesity     Presenting Symptoms:  Swelling, Varicose Veins and Pain     Technique:   Supine and Upright Ultrasound of the Deep and Superficial Veins with Valsalva and Compression Augmentation Maneuvers. Duplex Imaging is performed utilizing gray-scale, Two-dimensional images, color-flow imaging, Doppler waveform analysis, and Spectral doppler imaging done with provacative maneuvers.      Incompetency Criteria:  Deep vein reflux reported when greater than 1000 msec flow reversal. Superficial vein reflux reported when equal to or greater than 600 msec flow reversal.  vein reflux reported as greater than 350 msec flow reversal.      Right  Leg Deep Veins                 CFV SFJ PFV PROX SFV   PROX SFV MID SFV DIST POP V. PERON.   V. PTV'S   Compressibility  (FC,PC,NC) FC FC FC FC FC FC FC FC FC   Spontaneous +   + + + + +   +   Phasic  +   + + + + +   +   Augmentation +   + + + + +   +   Reflux -   - + - - -   -         Right Leg Saphenous Veins  Location SFJ PROX THIGH KNEE MID CALF SPJ PROX CALF MID CALF   RT GSV (mm) 7 RFA RFA RFA         Reflux - - - -         RT SSV (mm)         1 1 1   Reflux         - - -         Left Leg Deep Veins    CFV SFJ PFV PROX SFV PROX SFV MID SFV DIST POP V. PERON. V. PTV'S   Compressibility  (FC,PC,NC) FC FC FC FC FC FC FC FC FC   Spontaneous +   + + + + +   +   Phasic  +   + + + + +   +   Augmentation +   + + + + +   +   Reflux -   - - - - -   -         Lt Leg Saphenous Veins   Location SFJ PROX THIGH KNEE MID CALF SPJ PROX CALF MID CALF   LT GSV (mm) 9 4 4 3         Reflux + + - +         LT SSV (mm)         5 5 4   Reflux         - - -         Compression Study:  Normal           Impression:       Incompetent left great saphenous  vein in the proximal thigh and the level of saphenous femoral junction.  Otherwise, the rest of the deep and superficial venous systems is competent and patent.  No evidence of DVT bilaterally.     Reference:     Compressibility: FC= Fully compressible, PC= Partially compressible, NC= Non-compressible, NV= Not Visualized     Venous Doppler: (+) = Present  (0) = Absent  (-) = Decreased/Unable to Evaluate, (NV) = Not Visualized     Reflux: (+) Incompetent  (-) Competent, (NV) = Not Visualized     Interpretation criteria:      Duration of Retrograde flow (milliseconds)  Category Deep Veins Superficial Veins  veins   Competent < 1000ms < 600ms < 350ms   Incompetent > 1000ms > 600ms > 350ms            Assessment/Plan:    She has  incompetency and insuffiencey of the Left  Greater  saphenous vein.  Left GSV measures 9mm at the junction. Deep systems are intact. No DVTs. Great candidate for endovenous  closure. We spent counseling time more than 50% of visit: 15 minutes today and discussed the procedure. The risks of anesthesia, infection, bleeding, clotting, DVTs, numbess at the insertion site dermatome and  the process and procedure were discussed. Answered all questions today. Will submit this to [employer]'s insurance if needed for pre approval.Will set this up when approved. Discussed need to have a  and procedure woul take around 30 minutes with total time 2-3 hours. Also understands a small screening ultrasound 2-3 days out to rule out clot formation at the closed vessel.    DIAGNOSIS: Venous insufficiency of the  left greater saphenous vein      PROCEDURE: Endovenous closure of the left greater saphenous vein, endoseal glue    Too Tovar MD  [facility] Surgery Dept.              [facility] - back pain

## 2021-06-26 NOTE — PATIENT INSTRUCTIONS - HE
Continue to wear your compression stockings as much as possible. We can refill your sock prescription for 1 year otherwise your primary is able to refill them for you.  Call us with any problems or questions.

## 2021-06-30 NOTE — PROGRESS NOTES
Progress Notes by Too Tovar MD at 12/1/2020 10:00 AM     Author: Too Tovar MD Service: -- Author Type: Physician    Filed: 12/1/2020  1:44 PM Encounter Date: 12/1/2020 Status: Signed    : Too Tovar MD (Physician)           [facility] Vein Consult  Video Visit  Start: 10:04  End: 10:19      Assessment:     1. varicose veins, bilateral   2. spider veins, bilateral   3. Leg pain and swelling bilateral    Plan:     1. Treatment options of conservative therapy of stockings use, exercise, weight loss, elevating legs when possible.    2. Script for compression stockings 20-30 mm hg  3. Ultrasound to evaluate legs for incompetency of both deep and superficial system .   4. Surgical treatment, discussed briefly  5. Follow up: after ultrasound virtual or in person or tlelphone.   6. Call for any questions concerns or issues    Subjective:      Danielle Grant is a 45 y.o. female  who was referred by Yemi Lozoya MD  for evaluation of varicose veins. Symptoms include pain, aching, fatigue, burning, edema and dermatitis. Patient has history of leg selling, pain and vein issues that have progressed. Pain and symptoms have affected daily living and work activities needing medications. Here for evaluation today. no stocking or compression devic use    Allergies:Gluten, Pepper (genus capsicum), Strawberry, and Latex    Past Medical History:   Diagnosis Date   ? Allergic Rhinitis     Created by Conversion    ? Anxiety    ? Asthma    ? Excessive Facial Or Body Hair (Hirsutism)     Created by Conversion    ? Foot Pain (Soft Tissue)     Created by Conversion    ? Ganglion     Created by Conversion    ? Hypertension     Created by Conversion    ? Insomnia     Created by Conversion    ? Major Depression, Recurrent     Created by Conversion    ? Migraine Headache     Created by Conversion    ? Nephrolithiasis     Created by Conversion    ? Obesity     Created by Conversion    ?  Obstructive Sleep Apnea     Created by Conversion    ? Oligomenorrhea     Created by Conversion    ? Seborrheic Dermatitis     Created by Conversion    ? Sleep apnea    ? Type 2 Diabetes Mellitus     Created by Conversion    ? Vitamin D Deficiency     Created by Conversion        Past Surgical History:   Procedure Laterality Date   ? HYSTERECTOMY Bilateral 2016    Procedure: TOTAL LAPAROSCOPIC HYSTERECTOMY BILATERAL SALPINGECTOMY AND CYSTOSCOPY;  Surgeon: Juliana Santana MD;  Location: [facility];  Service:    ? [place]  DELIVERY ONLY      Description:  Section;  Recorded: 2008;   ? [place] LIGATE FALLOPIAN TUBE      Description: Tubal Ligation;  Recorded: 2008;   ? [place] TOTAL ABDOM HYSTERECTOMY N/A 2016    Procedure: ABDOMINAL SUBTOTAL  HYSTERECTOMY, ATTEMPTED LAPAROSCOPIC and MIRENA REMOVAL;  Surgeon: Juliana Santana MD;  Location: [facility];  Service: Gynecology       Current Outpatient Medications   Medication Sig Note   ? amitriptyline (ELAVIL) 150 MG tablet Take 150 mg by mouth bedtime.  2015: Received from: External Pharmacy   ? aspirin 81 mg chewable tablet Chew 81 mg daily.    ? buPROPion (WELLBUTRIN XL) 150 MG 24 hr tablet Take 150 mg by mouth. 2017: Received from: [facility] Received Sig: Take 1 tablet by mouth once daily.   ? cholecalciferol, vitamin D3, (VITAMIN D3) 1,000 unit capsule Take 2,000 Units by mouth bedtime.     ? clonazePAM (KLONOPIN) 0.5 MG tablet Take 0.5 mg by mouth.    ? CONTOUR NEXT STRIPS Strp TEST BLOOD SUGARS 3 TIMES DAILY    ? dulaglutide (TRULICITY) 1.5 mg/0.5 mL PnIj Inject 1.5 mg under the skin. 2017: Received from: [facility] Received Sig: Inject 1.5 mg subcutaneous once weekly.   ? DULoxetine (CYMBALTA) 20 MG capsule Take 60 mg by mouth bedtime.     ? EASY TOUCH TWIST LANCETS 30 gauge Misc  2015: Received from: External Pharmacy   ? eletriptan  (RELPAX) 20 MG tablet Take 2 tablets (40 mg total) by mouth as needed for migraine. may repeat in 2 hours if necessary    ? fluticasone (FLONASE) 50 mcg/actuation nasal spray 1 spray into each nostril. 12/12/2017: Received from: [facility] Received Sig: Inhale 1 Spray in the nostril(s) once daily.   ? loratadine (CLARITIN) 10 mg tablet Take 10 mg by mouth. 12/12/2017: Received from: [facility] Received Sig: Take 1 tablet by mouth once a day   ? magnesium 250 mg Tab Take 500 mg by mouth. 12/12/2017: Received from: [facility] Received Sig: Take 2 tablets by mouth once daily.   ? magnesium oxide 250 mg Tab Take 250 mg by mouth bedtime.     ? metFORMIN (GLUCOPHAGE-XR) 500 MG 24 hr tablet Take 2,000 mg by mouth bedtime.    ? montelukast (SINGULAIR) 10 mg tablet Take 10 mg by mouth bedtime.    ? orphenadrine (NORFLEX) 100 mg tablet Take 100 mg by mouth bedtime as needed for muscle spasms.    ? polyethylene glycol (GLYCOLAX) 17 gram/dose powder  12/12/2017: Received from: [facility]   ? senna-docusate (PERICOLACE) 8.6-50 mg tablet Take 1 tablet by mouth 2 (two) times a day.    ? spironolactone (ALDACTONE) 50 MG tablet Take 50 mg by mouth. 12/12/2017: Received from: [facility] Received Sig: Take 1 tablet by mouth 2 times daily.       Family History   Problem Relation Age of Onset   ? Varicose Veins Mother         reports that she has never smoked. She does not have any smokeless tobacco history on file. She reports current alcohol use. She reports that she does not use drugs.      Review of Systems:  Pertinent items are noted in HPI.  A 12 point comprehensive review of systems was negative except as noted.   Patient has symptomatic veins and changes of bilateral legs. These have progressed to the point of causing symptoms on a daily basis. This causes  issues with daily activities and chores such as washing dishes, vacuuming, outdoor upkeep and standing for long lengths of time       Objective:     There were no vitals filed for this visit.  There is no height or weight on file to calculate BMI.    EXAM:  GENERAL: This is a well-developed 45 y.o. female who appears her stated age  HEAD: normocephalic  VASCULAR: Pulses intact, symmetrical upper and lower extremities. There areskin changes consistent with chronic venous insufficiency. Varicose veins present in bilateral greater saphenous distribution. Spider veins present bilateral   From photos sent in.    The rest of the exam not done secondary to covid n19 restrictions.                              Imaging:    pending    Too Tovar MD  General Surgery [phone]  Vascular Surgery [phone]

## 2021-06-30 NOTE — PROGRESS NOTES
Progress Notes by Too Tovar MD at 12/15/2020  9:20 AM     Author: Too Tovar MD Service: -- Author Type: Physician    Filed: 2020  1:05 PM Encounter Date: 12/15/2020 Status: Signed    : Too Tovar MD (Physician)       Follow Up: Varicose Veins/ Venous Insufficiency    Danielle Grant is a 45 y.o.  female here for followup. She has worn stockings now for 3 months. I saw them previously in 2020.  Continued progression of disease and symptoms and issues; reviewed ultrasound results. Patient has ongoing symptoms with pain and swelling needing intervention with pain meds secondary to interfering with daily activities and work. This now inhibits daily chores and activities.     Allergies:Gluten, Pepper (genus capsicum), Strawberry, and Latex    Past Medical History:   Diagnosis Date   ? Allergic Rhinitis     Created by Conversion    ? Anxiety    ? Asthma    ? Excessive Facial Or Body Hair (Hirsutism)     Created by Conversion    ? Foot Pain (Soft Tissue)     Created by Conversion    ? Ganglion     Created by Conversion    ? Hypertension     Created by Conversion    ? Insomnia     Created by Conversion    ? Major Depression, Recurrent     Created by Conversion    ? Migraine Headache     Created by Conversion    ? Nephrolithiasis     Created by Conversion    ? Obesity     Created by Conversion    ? Obstructive Sleep Apnea     Created by Conversion    ? Oligomenorrhea     Created by Conversion    ? Seborrheic Dermatitis     Created by Conversion    ? Sleep apnea    ? Type 2 Diabetes Mellitus     Created by Conversion    ? Vitamin D Deficiency     Created by Conversion        Past Surgical History:   Procedure Laterality Date   ? HYSTERECTOMY Bilateral 2016    Procedure: TOTAL LAPAROSCOPIC HYSTERECTOMY BILATERAL SALPINGECTOMY AND CYSTOSCOPY;  Surgeon: Juliana Santana MD;  Location: [facility] OR;  Service:    ? [place]  DELIVERY ONLY      Description:   Section;  Recorded: 02/28/2008;   ? [place] LIGATE FALLOPIAN TUBE      Description: Tubal Ligation;  Recorded: 04/03/2008;   ? SC TOTAL ABDOM HYSTERECTOMY N/A 6/14/2016    Procedure: ABDOMINAL SUBTOTAL  HYSTERECTOMY, ATTEMPTED LAPAROSCOPIC and MIRENA REMOVAL;  Surgeon: Juliana Santana MD;  Location: [facility];  Service: Gynecology       CURRENT MEDS:  Current Outpatient Medications on File Prior to Visit   Medication Sig Dispense Refill   ? amitriptyline (ELAVIL) 150 MG tablet Take 150 mg by mouth bedtime.      ? aspirin 81 mg chewable tablet Chew 81 mg daily.     ? buPROPion (WELLBUTRIN XL) 150 MG 24 hr tablet Take 150 mg by mouth.     ? cholecalciferol, vitamin D3, (VITAMIN D3) 1,000 unit capsule Take 2,000 Units by mouth bedtime.      ? clonazePAM (KLONOPIN) 0.5 MG tablet Take 0.5 mg by mouth.     ? CONTOUR NEXT STRIPS Strp TEST BLOOD SUGARS 3 TIMES DAILY 100 strip PRN   ? dulaglutide (TRULICITY) 1.5 mg/0.5 mL PnIj Inject 1.5 mg under the skin.     ? DULoxetine (CYMBALTA) 20 MG capsule Take 60 mg by mouth bedtime.      ? EASY TOUCH TWIST LANCETS 30 gauge Misc      ? eletriptan (RELPAX) 20 MG tablet Take 2 tablets (40 mg total) by mouth as needed for migraine. may repeat in 2 hours if necessary 10 tablet 1   ? fluticasone (FLONASE) 50 mcg/actuation nasal spray 1 spray into each nostril.     ? loratadine (CLARITIN) 10 mg tablet Take 10 mg by mouth.     ? magnesium 250 mg Tab Take 500 mg by mouth.     ? magnesium oxide 250 mg Tab Take 250 mg by mouth bedtime.      ? metFORMIN (GLUCOPHAGE-XR) 500 MG 24 hr tablet Take 2,000 mg by mouth bedtime.     ? montelukast (SINGULAIR) 10 mg tablet Take 10 mg by mouth bedtime.     ? orphenadrine (NORFLEX) 100 mg tablet Take 100 mg by mouth bedtime as needed for muscle spasms.     ? polyethylene glycol (GLYCOLAX) 17 gram/dose powder      ? senna-docusate (PERICOLACE) 8.6-50 mg tablet Take 1 tablet by mouth 2 (two) times a day. 30 tablet 0   ? spironolactone (ALDACTONE) 50 MG  tablet Take 50 mg by mouth.       No current facility-administered medications on file prior to visit.        Family History   Problem Relation Age of Onset   ? Varicose Veins Mother         reports that she has never smoked. She does not have any smokeless tobacco history on file. She reports current alcohol use. She reports that she does not use drugs.    Review of Systems:  Negative except continued symptoms and aissues  Patient has symptomatic veins and changes of bilateral legs. These have progressed to the point of causing symptoms on a daily basis. This causes issues with daily activities and chores such as washing dishes, vacuuming, outdoor upkeep and standing for long lengths of time. She has worn compression now for greater than 3 months, worked on an exercise and weight loss program and continues to have symptoms.     OBJECTIVE:  There were no vitals filed for this visit.  There is no height or weight on file to calculate BMI.    EXAM:  GENERAL: This is a well-developed 45 y.o. female who appears her stated age  HEAD: normocephalic    The rest of the exam not done secondary to video visit and covid n19 restrictions.                  Imaging:    US Venous Insufficiency Legs Bilateral (Order [id number])  Imaging  Date: 12/11/2020 Department: [facility] [place] Released By: Ida Melendez Authorizing: Too Tovar MD   Study Result    BILATERAL Venous Insufficiency Ultrasound     BILATERAL Lower Extremity          Indication:  Surveillance of bilateral leg pain vv and swelling, HX of Right GSV 2015        Patient History: Swelling, Stasis, Vein Stripping and Morbid Obesity     Presenting Symptoms:  Swelling, Varicose Veins and Pain     Technique:   Supine and Upright Ultrasound of the Deep and Superficial Veins with Valsalva and Compression Augmentation Maneuvers. Duplex Imaging is performed utilizing gray-scale, Two-dimensional images, color-flow imaging, Doppler  waveform analysis, and Spectral doppler imaging done with provacative maneuvers.      Incompetency Criteria:  Deep vein reflux reported when greater than 1000 msec flow reversal. Superficial vein reflux reported when equal to or greater than 600 msec flow reversal.  vein reflux reported as greater than 350 msec flow reversal.      Right  Leg Deep Veins                 CFV SFJ PFV PROX SFV   PROX SFV MID SFV DIST POP V. PERON.   V. PTV'S   Compressibility  (FC,PC,NC) FC FC FC FC FC FC FC FC FC   Spontaneous +   + + + + +   +   Phasic  +   + + + + +   +   Augmentation +   + + + + +   +   Reflux -   - + - - -   -         Right Leg Saphenous Veins  Location SFJ PROX THIGH KNEE MID CALF SPJ PROX CALF MID CALF   RT GSV (mm) 7 RFA RFA RFA         Reflux - - - -         RT SSV (mm)         1 1 1   Reflux         - - -         Left Leg Deep Veins    CFV SFJ PFV PROX SFV PROX SFV MID SFV DIST POP V. PERON. V. PTV'S   Compressibility  (FC,PC,NC) FC FC FC FC FC FC FC FC FC   Spontaneous +   + + + + +   +   Phasic  +   + + + + +   +   Augmentation +   + + + + +   +   Reflux -   - - - - -   -         Lt Leg Saphenous Veins   Location SFJ PROX THIGH KNEE MID CALF SPJ PROX CALF MID CALF   LT GSV (mm) 9 4 4 3         Reflux + + - +         LT SSV (mm)         5 5 4   Reflux         - - -         Compression Study:  Normal           Impression:       Incompetent left great saphenous vein in the proximal thigh and the level of saphenous femoral junction.  Otherwise, the rest of the deep and superficial venous systems is competent and patent.  No evidence of DVT bilaterally.     Reference:     Compressibility: FC= Fully compressible, PC= Partially compressible, NC= Non-compressible, NV= Not Visualized     Venous Doppler: (+) = Present  (0) = Absent  (-) = Decreased/Unable to Evaluate, (NV) = Not Visualized     Reflux: (+) Incompetent  (-) Competent, (NV) = Not Visualized     Interpretation criteria:      Duration of  Retrograde flow (milliseconds)  Category Deep Veins Superficial Veins  veins   Competent < 1000ms < 600ms < 350ms   Incompetent > 1000ms > 600ms > 350ms            Assessment/Plan:    She has  incompetency and insuffiencey of the Left  Greater  saphenous vein.  Left GSV measures 9mm at the junction. Deep systems are intact. No DVTs. Great candidate for endovenous  closure. We spent counseling time more than 50% of visit: 15 minutes today and discussed the procedure. The risks of anesthesia, infection, bleeding, clotting, DVTs, numbess at the insertion site dermatome and  the process and procedure were discussed. Answered all questions today. Will submit this to [employer] if needed for pre approval.Will set this up when approved. Discussed need to have a  and procedure woul take around 30 minutes with total time 2-3 hours. Also understands a small screening ultrasound 2-3 days out to rule out clot formation at the closed vessel.    DIAGNOSIS: Venous insufficiency of the  left greater saphenous vein      PROCEDURE: Endovenous closure of the left greater saphenous vein, endoseal glue    Too Tovar MD  [facility].

## 2021-07-04 NOTE — LETTER
Letter by Too Tovar MD at      Author: Too Tovar MD Service: -- Author Type: --    Filed:  Encounter Date: 6/1/2021 Status: (Other)         Yemi Lozoya MD  [id number] [address]                                  June 1, 2021    Patient: Danielle Grant   MR Number: [id number]   YOB: 1975   Date of Visit: 6/1/2021     Dear Dr. Devora MD:    Thank you for referring Danielle Grant to me for evaluation. Below are the relevant portions of my assessment and plan of care.    If you have questions, please do not hesitate to call me. I look forward to following Danielle along with you.    Sincerely,        Too Tovar MD            Too Montgomery MD  6/1/2021  4:10 PM  Sign when Signing Visit  [facility] Surgery Follow up    HPI:    45 y.o. year old female who returns for a follow up.  She is here for follow-up 4 months out from her closure procedures of her left leg.  She is doing quite well continues wear compression regular basis and has minimal symptoms or problems.  Her veins are really decreased in size and she is got no major issues at this time point.    Allergies:Gluten, Other food allergy, Pepper (genus capsicum), Strawberry, and Latex    Past Medical History:   Diagnosis Date   ? Allergic Rhinitis     Created by Conversion    ? Anxiety    ? Asthma    ? Excessive Facial Or Body Hair (Hirsutism)     Created by Conversion    ? Foot Pain (Soft Tissue)     Created by Conversion    ? Ganglion     Created by Conversion    ? Hypertension     Created by Conversion    ? Insomnia     Created by Conversion    ? Major Depression, Recurrent     Created by Conversion    ? Migraine Headache     Created by Conversion    ? Nephrolithiasis     Created by Conversion    ? Obesity     Created by Conversion    ? Obstructive Sleep Apnea     Created by Conversion    ? Oligomenorrhea     Created by Conversion    ? Seborrheic Dermatitis     Created  by Conversion    ? Sleep apnea    ? Type 2 Diabetes Mellitus     Created by Conversion    ? Vitamin D Deficiency     Created by Conversion        Past Surgical History:   Procedure Laterality Date   ? HYSTERECTOMY Bilateral 2016    Procedure: TOTAL LAPAROSCOPIC HYSTERECTOMY BILATERAL SALPINGECTOMY AND CYSTOSCOPY;  Surgeon: Juliana Santana MD;  Location: [facility];  Service:    ? [place]  DELIVERY ONLY      Description:  Section;  Recorded: 2008;   ? [place] LIGATE FALLOPIAN TUBE      Description: Tubal Ligation;  Recorded: 2008;   ? CA TOTAL ABDOM HYSTERECTOMY N/A 2016    Procedure: ABDOMINAL SUBTOTAL  HYSTERECTOMY, ATTEMPTED LAPAROSCOPIC and MIRENA REMOVAL;  Surgeon: Julaina Santana MD;  Location: [facility];  Service: Gynecology       CURRENT MEDS:  Current Outpatient Medications on File Prior to Visit   Medication Sig Dispense Refill   ? amitriptyline (ELAVIL) 150 MG tablet Take 150 mg by mouth bedtime.      ? aspirin 81 mg chewable tablet Chew 81 mg daily.     ? buPROPion (WELLBUTRIN XL) 150 MG 24 hr tablet Take 150 mg by mouth.     ? cholecalciferol, vitamin D3, (VITAMIN D3) 1,000 unit capsule Take 2,000 Units by mouth bedtime.      ? clonazePAM (KLONOPIN) 0.5 MG tablet Take 0.5 mg by mouth.     ? CONTOUR NEXT STRIPS Strp TEST BLOOD SUGARS 3 TIMES DAILY 100 strip PRN   ? dulaglutide (TRULICITY) 1.5 mg/0.5 mL PnIj Inject 1.5 mg under the skin.     ? DULoxetine (CYMBALTA) 20 MG capsule Take 60 mg by mouth bedtime.      ? EASY TOUCH TWIST LANCETS 30 gauge Misc      ? eletriptan (RELPAX) 20 MG tablet Take 2 tablets (40 mg total) by mouth as needed for migraine. may repeat in 2 hours if necessary 10 tablet 1   ? fluticasone (FLONASE) 50 mcg/actuation nasal spray 1 spray into each nostril.     ? loratadine (CLARITIN) 10 mg tablet Take 10 mg by mouth.     ? magnesium 250 mg Tab Take 500 mg by mouth.     ? magnesium oxide 250 mg Tab Take 250 mg by mouth bedtime.      ?  metFORMIN (GLUCOPHAGE-XR) 500 MG 24 hr tablet Take 2,000 mg by mouth bedtime.     ? montelukast (SINGULAIR) 10 mg tablet Take 10 mg by mouth bedtime.     ? orphenadrine (NORFLEX) 100 mg tablet Take 100 mg by mouth bedtime as needed for muscle spasms.     ? polyethylene glycol (GLYCOLAX) 17 gram/dose powder      ? senna-docusate (PERICOLACE) 8.6-50 mg tablet Take 1 tablet by mouth 2 (two) times a day. 30 tablet 0   ? spironolactone (ALDACTONE) 50 MG tablet Take 50 mg by mouth.       Current Facility-Administered Medications on File Prior to Visit   Medication Dose Route Frequency Provider Last Rate Last Admin   ? lidocaine 10 mg/mL (1 %) injection 10 mL  10 mL Other Once Too Tovar MD           Family History   Problem Relation Age of Onset   ? Varicose Veins Mother         reports that she has never smoked. She has never used smokeless tobacco. She reports current alcohol use. She reports that she does not use drugs.    Review of Systems:  Negative except history it long history of vein disease closures and multiple vessels multiple times.  She is doing well now and compression uses a regular occurrence for her.    OBJECTIVE:  Vitals:    06/01/21 1042   BP: 128/86   Pulse: 64   Resp: 18   Temp: 98.1  F (36.7  C)   TempSrc: Oral     There is no height or weight on file to calculate BMI.    EXAM:  GENERAL: This is a well-developed 45 y.o. female who appears her stated age  HEAD: normocephalic  HEENT: Pupils equal and reactive bilaterally  CARDIAC: RRR without murmur  CHEST/LUNG:  Clear to auscultation  ABDOMEN: Soft, nontender, nondistended, no masses    NEUROLOGIC: Focally intact, nonfocal  VASCULAR: Pulses intact, symmetrical upper and lower extremities.  No large varicosities are noted        LABS:  Lab Results   Component Value Date    WBC 8.8 11/06/2012    HGB 11.8 (L) 06/15/2016    HCT 39.8 11/06/2012    MCV 87 11/06/2012     06/16/2016     INR/Prothrombin Time      Lab Results   Component Value  Date    HGBA1C 7.5 (H) 11/04/2014     Lab Results   Component Value Date    ALT 20 11/04/2014    AST 16 11/04/2014    ALKPHOS 80 11/04/2014    BILITOT 0.2 11/04/2014        Images:               Assessment/Plan:   1. Varicose veins of bilateral lower extremities with other complications  Status post closure 4 months out she is doing really well continues to wear compression and will continue to do so work on exercise and weight loss discussed and answered all questions with her today.  - Compression stockings    2. Venous insufficiency of left leg  Same      No follow-ups on file.     Too Tovar MD  [facility] Department of Surgery

## 2021-07-04 NOTE — PROGRESS NOTES
Progress Notes by Too Tovar MD at 2021 10:40 AM     Author: Too Tovar MD Service: -- Author Type: Physician    Filed: 2021  4:11 PM Encounter Date: 2021 Status: Signed    : Too Tovar MD (Physician)       [facility] Surgery Follow up    HPI:    45 y.o. year old female who returns for a follow up.  She is here for follow-up 4 months out from her closure procedures of her left leg.  She is doing quite well continues wear compression regular basis and has minimal symptoms or problems.  Her veins are really decreased in size and she is got no major issues at this time point.    Allergies:Gluten, Other food allergy, Pepper (genus capsicum), Strawberry, and Latex    Past Medical History:   Diagnosis Date   ? Allergic Rhinitis     Created by Conversion    ? Anxiety    ? Asthma    ? Excessive Facial Or Body Hair (Hirsutism)     Created by Conversion    ? Foot Pain (Soft Tissue)     Created by Conversion    ? Ganglion     Created by Conversion    ? Hypertension     Created by Conversion    ? Insomnia     Created by Conversion    ? Major Depression, Recurrent     Created by Conversion    ? Migraine Headache     Created by Conversion    ? Nephrolithiasis     Created by Conversion    ? Obesity     Created by Conversion    ? Obstructive Sleep Apnea     Created by Conversion    ? Oligomenorrhea     Created by Conversion    ? Seborrheic Dermatitis     Created by Conversion    ? Sleep apnea    ? Type 2 Diabetes Mellitus     Created by Conversion    ? Vitamin D Deficiency     Created by Conversion        Past Surgical History:   Procedure Laterality Date   ? HYSTERECTOMY Bilateral 2016    Procedure: TOTAL LAPAROSCOPIC HYSTERECTOMY BILATERAL SALPINGECTOMY AND CYSTOSCOPY;  Surgeon: Juliana Santana MD;  Location: [facility] OR;  Service:    ? [place]  DELIVERY ONLY      Description:  Section;  Recorded: 2008;   ? [place] LIGATE FALLOPIAN TUBE      Description: Tubal  Ligation;  Recorded: 04/03/2008;   ? [place] TOTAL ABDOM HYSTERECTOMY N/A 6/14/2016    Procedure: ABDOMINAL SUBTOTAL  HYSTERECTOMY, ATTEMPTED LAPAROSCOPIC and MIRENA REMOVAL;  Surgeon: Juliana Santana MD;  Location: [facility] OR;  Service: Gynecology       CURRENT MEDS:  Current Outpatient Medications on File Prior to Visit   Medication Sig Dispense Refill   ? amitriptyline (ELAVIL) 150 MG tablet Take 150 mg by mouth bedtime.      ? aspirin 81 mg chewable tablet Chew 81 mg daily.     ? buPROPion (WELLBUTRIN XL) 150 MG 24 hr tablet Take 150 mg by mouth.     ? cholecalciferol, vitamin D3, (VITAMIN D3) 1,000 unit capsule Take 2,000 Units by mouth bedtime.      ? clonazePAM (KLONOPIN) 0.5 MG tablet Take 0.5 mg by mouth.     ? CONTOUR NEXT STRIPS Strp TEST BLOOD SUGARS 3 TIMES DAILY 100 strip PRN   ? dulaglutide (TRULICITY) 1.5 mg/0.5 mL PnIj Inject 1.5 mg under the skin.     ? DULoxetine (CYMBALTA) 20 MG capsule Take 60 mg by mouth bedtime.      ? EASY TOUCH TWIST LANCETS 30 gauge Misc      ? eletriptan (RELPAX) 20 MG tablet Take 2 tablets (40 mg total) by mouth as needed for migraine. may repeat in 2 hours if necessary 10 tablet 1   ? fluticasone (FLONASE) 50 mcg/actuation nasal spray 1 spray into each nostril.     ? loratadine (CLARITIN) 10 mg tablet Take 10 mg by mouth.     ? magnesium 250 mg Tab Take 500 mg by mouth.     ? magnesium oxide 250 mg Tab Take 250 mg by mouth bedtime.      ? metFORMIN (GLUCOPHAGE-XR) 500 MG 24 hr tablet Take 2,000 mg by mouth bedtime.     ? montelukast (SINGULAIR) 10 mg tablet Take 10 mg by mouth bedtime.     ? orphenadrine (NORFLEX) 100 mg tablet Take 100 mg by mouth bedtime as needed for muscle spasms.     ? polyethylene glycol (GLYCOLAX) 17 gram/dose powder      ? senna-docusate (PERICOLACE) 8.6-50 mg tablet Take 1 tablet by mouth 2 (two) times a day. 30 tablet 0   ? spironolactone (ALDACTONE) 50 MG tablet Take 50 mg by mouth.       Current Facility-Administered Medications on File  Prior to Visit   Medication Dose Route Frequency Provider Last Rate Last Admin   ? lidocaine 10 mg/mL (1 %) injection 10 mL  10 mL Other Once Too Tovar MD           Family History   Problem Relation Age of Onset   ? Varicose Veins Mother         reports that she has never smoked. She has never used smokeless tobacco. She reports current alcohol use. She reports that she does not use drugs.    Review of Systems:  Negative except history it long history of vein disease closures and multiple vessels multiple times.  She is doing well now and compression uses a regular occurrence for her.    OBJECTIVE:  Vitals:    06/01/21 1042   BP: 128/86   Pulse: 64   Resp: 18   Temp: 98.1  F (36.7  C)   TempSrc: Oral     There is no height or weight on file to calculate BMI.    EXAM:  GENERAL: This is a well-developed 45 y.o. female who appears her stated age  HEAD: normocephalic  HEENT: Pupils equal and reactive bilaterally  CARDIAC: RRR without murmur  CHEST/LUNG:  Clear to auscultation  ABDOMEN: Soft, nontender, nondistended, no masses    NEUROLOGIC: Focally intact, nonfocal  VASCULAR: Pulses intact, symmetrical upper and lower extremities.  No large varicosities are noted        LABS:  Lab Results   Component Value Date    WBC 8.8 11/06/2012    HGB 11.8 (L) 06/15/2016    HCT 39.8 11/06/2012    MCV 87 11/06/2012     06/16/2016     INR/Prothrombin Time      Lab Results   Component Value Date    HGBA1C 7.5 (H) 11/04/2014     Lab Results   Component Value Date    ALT 20 11/04/2014    AST 16 11/04/2014    ALKPHOS 80 11/04/2014    BILITOT 0.2 11/04/2014        Images:               Assessment/Plan:   1. Varicose veins of bilateral lower extremities with other complications  Status post closure 4 months out she is doing really well continues to wear compression and will continue to do so work on exercise and weight loss discussed and answered all questions with her today.  - Compression stockings    2. Venous  insufficiency of left leg  Same      No follow-ups on file.     Too Tovar MD  [facility] Department of Surgery

## 2021-07-06 VITALS
HEART RATE: 64 BPM | RESPIRATION RATE: 18 BRPM | TEMPERATURE: 98.1 F | DIASTOLIC BLOOD PRESSURE: 86 MMHG | SYSTOLIC BLOOD PRESSURE: 128 MMHG

## 2021-07-24 ENCOUNTER — HEALTH MAINTENANCE LETTER (OUTPATIENT)
Age: 46
End: 2021-07-24

## 2021-09-18 ENCOUNTER — HEALTH MAINTENANCE LETTER (OUTPATIENT)
Age: 46
End: 2021-09-18

## 2022-03-05 ENCOUNTER — HEALTH MAINTENANCE LETTER (OUTPATIENT)
Age: 47
End: 2022-03-05

## 2022-08-20 ENCOUNTER — HEALTH MAINTENANCE LETTER (OUTPATIENT)
Age: 47
End: 2022-08-20

## 2022-09-18 ENCOUNTER — OFFICE VISIT (OUTPATIENT)
Dept: FAMILY MEDICINE | Facility: CLINIC | Age: 47
End: 2022-09-18

## 2022-09-18 VITALS
HEART RATE: 64 BPM | OXYGEN SATURATION: 100 % | WEIGHT: 293 LBS | DIASTOLIC BLOOD PRESSURE: 75 MMHG | SYSTOLIC BLOOD PRESSURE: 113 MMHG | BODY MASS INDEX: 50.21 KG/M2 | TEMPERATURE: 97.7 F

## 2022-09-18 DIAGNOSIS — J06.9 VIRAL URI WITH COUGH: Primary | ICD-10-CM

## 2022-09-18 PROCEDURE — U0005 INFEC AGEN DETEC AMPLI PROBE: HCPCS | Performed by: FAMILY MEDICINE

## 2022-09-18 PROCEDURE — U0003 INFECTIOUS AGENT DETECTION BY NUCLEIC ACID (DNA OR RNA); SEVERE ACUTE RESPIRATORY SYNDROME CORONAVIRUS 2 (SARS-COV-2) (CORONAVIRUS DISEASE [COVID-19]), AMPLIFIED PROBE TECHNIQUE, MAKING USE OF HIGH THROUGHPUT TECHNOLOGIES AS DESCRIBED BY CMS-2020-01-R: HCPCS | Performed by: FAMILY MEDICINE

## 2022-09-18 PROCEDURE — 99203 OFFICE O/P NEW LOW 30 MIN: CPT | Mod: CS | Performed by: FAMILY MEDICINE

## 2022-09-18 RX ORDER — ALBUTEROL SULFATE 90 UG/1
2 AEROSOL, METERED RESPIRATORY (INHALATION) EVERY 4 HOURS PRN
Qty: 18 G | Refills: 0 | Status: SHIPPED | OUTPATIENT
Start: 2022-09-18

## 2022-09-18 NOTE — PROGRESS NOTES
Assessment & Plan     Viral URI with cough  - Symptomatic; Yes; 9/16/2022 COVID-19 Virus (Coronavirus) by PCR Nose  - albuterol (PROAIR HFA/PROVENTIL HFA/VENTOLIN HFA) 108 (90 Base) MCG/ACT inhaler  Dispense: 18 g; Refill: 0     Clear lungs, stable vitals. No indication for lung imaging. Presumed viral illness.   Albuterol refill provided    COVID test collected -- discussed if positive should call clinic or schedule same day virtual to have consideration for anti-viral therapy    See AVS summary for additional recommendations reviewed with patient during this visit.       Fre Galindo MD   [facility]    Subjective     Danielle is a 47 year old female who presents to clinic today for the following health issues:  Chief Complaint   Patient presents with     Urgent Care     Nasal Congestion     C/O nasal congestion for 3 days     HPI    No fevers  Sick with congestion over last few days  Lost albuterol inhaler a couple months ago.   No vomiting/diarrhea  nO exposures to COVID. Son also as of 2-3 days ago with increasing cough/cold symptoms.   No COVID diagnosis in last 6 months    Flu/COVID booster received a few days ago.       Patient Active Problem List    Diagnosis Date Noted     Ganglion      Priority: Medium     Created by Conversion  Replacement Utility updated for latest IMO load         Vitamin D Deficiency      Priority: Medium     Created by Conversion  Replacement Utility updated for latest IMO load         Allergic Rhinitis      Priority: Medium     Created by Conversion  Replacement Utility updated for latest IMO load         Migraine Headache      Priority: Medium     Created by Conversion  Replacement Utility updated for latest IMO load         Major Depression, Recurrent      Priority: Medium     Created by Conversion  Replacement Utility updated for latest IMO load         Acute blood loss anemia      Priority: Medium     Essential hypertension      Priority: Medium     Created by  Conversion         Non-insulin dependent type 2 diabetes mellitus (H)      Priority: Medium     Created by Conversion         S/P hysterectomy 06/14/2016     Priority: Medium     Right leg pain 01/14/2015     Priority: Medium     Foot Pain (Soft Tissue)      Priority: Medium     Created by Conversion         Seborrheic Dermatitis      Priority: Medium     Created by Conversion         Insomnia      Priority: Medium     Created by Conversion         Obesity      Priority: Medium     Created by Conversion         Obstructive Sleep Apnea      Priority: Medium     Created by Conversion         Oligomenorrhea      Priority: Medium     Created by Conversion         Excessive Facial Or Body Hair (Hirsutism)      Priority: Medium     Created by Conversion         Nephrolithiasis      Priority: Medium     Created by Conversion           Current Outpatient Medications   Medication     albuterol (PROAIR HFA/PROVENTIL HFA/VENTOLIN HFA) 108 (90 Base) MCG/ACT inhaler     amitriptyline (ELAVIL) 150 MG tablet     aspirin 81 mg chewable tablet     buPROPion (WELLBUTRIN XL) 150 MG 24 hr tablet     cholecalciferol, vitamin D3, (VITAMIN D3) 1,000 unit capsule     clonazePAM (KLONOPIN) 0.5 MG tablet     CONTOUR NEXT STRIPS Strp     dulaglutide (TRULICITY) 1.5 mg/0.5 mL PnIj     DULoxetine (CYMBALTA) 20 MG capsule     EASY TOUCH TWIST LANCETS 30 gauge Misc     eletriptan (RELPAX) 20 MG tablet     fluticasone (FLONASE) 50 mcg/actuation nasal spray     loratadine (CLARITIN) 10 mg tablet     magnesium 250 mg Tab     magnesium oxide 250 mg Tab     metFORMIN (GLUCOPHAGE-XR) 500 MG 24 hr tablet     montelukast (SINGULAIR) 10 mg tablet     orphenadrine (NORFLEX) 100 mg tablet     polyethylene glycol (GLYCOLAX) 17 gram/dose powder     senna-docusate (PERICOLACE) 8.6-50 mg tablet     spironolactone (ALDACTONE) 50 MG tablet     No current facility-administered medications for this visit.         Objective    /75   Pulse 64   Temp 97.7  F  (36.5  C) (Tympanic)   Wt (!) 154.2 kg (340 lb)   SpO2 100%   BMI 50.21 kg/m    Physical Exam   Ears: normal bilaterally  GEN: normal mentation, no distress  CV: RRR no m/r/g  Pulm: clear bilaterally    No results found for any visits on 09/18/22.        The use of Dragon/Genciaation services may have been used to construct the content in this note; any grammatical or spelling errors are non-intentional. Please contact the author of this note directly if you are in need of any clarification.

## 2022-09-18 NOTE — PATIENT INSTRUCTIONS
Results for the COVID PCR test collected today takes about 24 hours to return.   You can check your result in [facility] - our nurses only call if the tests return positive. If you have reviewed your test results in [facility] before they make a phone call then you likely not be hearing from the nurses.      Please quarantine at this time.     If you are positive for COVID you may be eligible for anti-viral therapy -- your options include but are not limited to:   1) schedule a virtual appointment thru [facility] (for a same day appointment)  2) call your Doctor's office right away  3) return to urgent care (please wear a mask and notify them you are COVID positive and are seeking anti-viral treatment)      If your symptoms get worse: chest pain, shortness of breath, lightheadedness/dizziness -- please seek medical attention right away. Go to the hospital immediately if the symptoms are severe.       For cough (can take all of them together):   - Dextromethorphan 'DM' (over the counter - can be found in Robitussin/Delsym/generic cough meds)  - Honey: lozenges/cough drops or mix honey in warm water

## 2022-09-19 LAB — SARS-COV-2 RNA RESP QL NAA+PROBE: NEGATIVE

## 2022-11-20 ENCOUNTER — HEALTH MAINTENANCE LETTER (OUTPATIENT)
Age: 47
End: 2022-11-20

## 2023-02-19 ENCOUNTER — HOSPITAL ENCOUNTER (EMERGENCY)
Facility: CLINIC | Age: 48
Discharge: HOME | End: 2023-02-19
Attending: EMERGENCY MEDICINE | Admitting: EMERGENCY MEDICINE
Payer: COMMERCIAL

## 2023-02-19 VITALS
HEIGHT: 69 IN | RESPIRATION RATE: 20 BRPM | WEIGHT: 293 LBS | HEART RATE: 66 BPM | TEMPERATURE: 97 F | SYSTOLIC BLOOD PRESSURE: 132 MMHG | DIASTOLIC BLOOD PRESSURE: 78 MMHG | OXYGEN SATURATION: 98 % | BODY MASS INDEX: 43.4 KG/M2

## 2023-02-19 DIAGNOSIS — J02.0 STREP THROAT: ICD-10-CM

## 2023-02-19 LAB
FLUAV RNA SPEC QL NAA+PROBE: NEGATIVE
FLUBV RNA RESP QL NAA+PROBE: NEGATIVE
GROUP A STREP BY PCR: DETECTED
RSV RNA SPEC NAA+PROBE: NEGATIVE
SARS-COV-2 RNA RESP QL NAA+PROBE: NEGATIVE

## 2023-02-19 PROCEDURE — C9803 HOPD COVID-19 SPEC COLLECT: HCPCS

## 2023-02-19 PROCEDURE — 87637 SARSCOV2&INF A&B&RSV AMP PRB: CPT | Performed by: EMERGENCY MEDICINE

## 2023-02-19 PROCEDURE — 87651 STREP A DNA AMP PROBE: CPT | Performed by: EMERGENCY MEDICINE

## 2023-02-19 PROCEDURE — 99283 EMERGENCY DEPT VISIT LOW MDM: CPT | Mod: CS

## 2023-02-19 RX ORDER — CEPHALEXIN 500 MG/1
500 CAPSULE ORAL 2 TIMES DAILY
Qty: 20 CAPSULE | Refills: 0 | Status: SHIPPED | OUTPATIENT
Start: 2023-02-19 | End: 2023-02-19

## 2023-02-19 RX ORDER — CEPHALEXIN 500 MG/1
500 CAPSULE ORAL 2 TIMES DAILY
Qty: 20 CAPSULE | Refills: 0 | Status: SHIPPED | OUTPATIENT
Start: 2023-02-19

## 2023-02-19 ASSESSMENT — ENCOUNTER SYMPTOMS
SORE THROAT: 1
SHORTNESS OF BREATH: 0
CHILLS: 0
FEVER: 0
TROUBLE SWALLOWING: 0
COUGH: 0

## 2023-02-19 NOTE — ED PROVIDER NOTES
EMERGENCY DEPARTMENT ENCOUNTER      NAME: Danielle Grant  AGE: 47 year old female  YOB: 1975  MRN: [id number]  EVALUATION DATE & TIME: No admission date for patient encounter.    PCP: Yemi Lozoya    ED PROVIDER: Silvestre Blackwood MD    Chief Complaint   Patient presents with     Pharyngitis     FINAL IMPRESSION:  1. Strep throat      ED COURSE & MEDICAL DECISION MAKING:    Pertinent Labs & Imaging studies reviewed. (See chart for details)  47 year old female presents to the Emergency Department for evaluation of sore throat.  Patient concerned about the possibility of streptococcal infection.  Recurrent issues with tonsillitis throughout her adult life.  On examination she had symmetrical tonsillar swelling with exudate.  No other significant intraoral abnormalities no sublingual tenderness.  No tenderness to palpation of the anterior neck or evidence of any neck swelling or significant lymphadenopathy no meningeal signs clinical examination eating and drinking fine.  Testing in the emergency department did indicate acute streptococcal infection.  Recommend a course of antibiotics.  Plan for discharge home I did  the patient that there is benefit for the patient to follow-up with ear nose throat on outpatient basis given her recurrent issues with tonsillitis.  I think patient appropriate for discharge with no further work-up indicated at this time reviewed return precautions prior to discharge.    2:12 PM I met with the patient to gather history and perform an initial exam. PPE: gloves, N95 mask         At the conclusion of the encounter I discussed the results of all of the tests and the disposition. The questions were answered. The patient or family acknowledged understanding and was agreeable with the care plan.     Medical Decision Making    History:    Supplemental history from: Documented in chart, if applicable    External Record(s) reviewed: Documented in chart, if  applicable.    Work Up:    Chart documentation includes differential considered and any EKGs or imaging independently interpreted by provider, where specified.    In additional to work up documented, I considered the following work up: Documented in chart, if applicable.    External consultation:    Discussion of management with another provider: Documented in chart, if applicable    Complicating factors:    Care impacted by chronic illness: Other: asthma    Care affected by social determinants of health: N/A    Disposition considerations: Discharge. I prescribed additional prescription strength medication(s) as charted. See documentation for any additional details.      MEDICATIONS GIVEN IN THE EMERGENCY:  Medications - No data to display    NEW PRESCRIPTIONS STARTED AT TODAY'S ER VISIT  Discharge Medication List as of 2/19/2023  3:48 PM             =================================================================    HPI    Patient information was obtained from: the patient    Use of : N/A    Danielle Grant is a 47 year old female with a pertinent history of asthma who presents to this ED by private car for evaluation of sore throat.    Per chart review, patient was recently diagnosed with Covid in 12/13/22 and treated with Paxlovid. Seen most recently via telehealth appt for pink eye 2/2/23. Prescribed tobramycin eye drops.    She reports development of mild, perisistent sore throat on Wednesday 2/15 which has continued to today. She notes anterior discomfort in throat with swallowing, but no trouble swallowing.     She denies fevers, chills. She denies cough, or difficulty breathing. She has a hx of enlarged tonsils and has frequent strep infections causing throat soreness. She has been considering seeing a specialist for tonsil extraction.      REVIEW OF SYSTEMS   Review of Systems   Constitutional: Negative for chills and fever.   HENT: Positive for sore throat. Negative for trouble  swallowing.    Respiratory: Negative for cough and shortness of breath.        PAST MEDICAL HISTORY:  Past Medical History:   Diagnosis Date     Allergic rhinitis, cause unspecified     Created by Conversion      Anxiety      Asthma      Calculus of kidney     Created by Conversion      Ganglion, unspecified     Created by Conversion      Hirsutism     Created by Conversion      Insomnia, unspecified     Created by Conversion      Major depressive disorder, recurrent episode, unspecified     Created by Conversion      Migraine, unspecified, without mention of intractable migraine without mention of status migrainosus     Created by Conversion      Obesity, unspecified     Created by Conversion      Obstructive sleep apnea (adult) (pediatric)     Created by Conversion      Pain in limb     Created by Conversion      Scanty or infrequent menstruation     Created by Conversion      Seborrheic dermatitis, unspecified     Created by Conversion      Sleep apnea      Type II or unspecified type diabetes mellitus without mention of complication, not stated as uncontrolled     Created by Conversion      Unspecified essential hypertension     Created by Conversion      Unspecified vitamin D deficiency     Created by Conversion        PAST SURGICAL HISTORY:  Past Surgical History:   Procedure Laterality Date     HYSTERECTOMY Bilateral 2016    Procedure: TOTAL LAPAROSCOPIC HYSTERECTOMY BILATERAL SALPINGECTOMY AND CYSTOSCOPY;  Surgeon: Juliana Santana MD;  Location: [facility];  Service:      [facility]  DELIVERY ONLY      Description:  Section;  Recorded: 2008;     [facility] LIGATE FALLOPIAN TUBE      Description: Tubal Ligation;  Recorded: 2008;     [facility] TOTAL ABDOM HYSTERECTOMY N/A 2016    Procedure: ABDOMINAL SUBTOTAL  HYSTERECTOMY, ATTEMPTED LAPAROSCOPIC and MIRENA REMOVAL;  Surgeon: Juliana Santana MD;  Location: [facility] Main OR;  Service: Gynecology           CURRENT MEDICATIONS:   "  cephALEXin (KEFLEX) 500 MG capsule  albuterol (PROAIR HFA/PROVENTIL HFA/VENTOLIN HFA) 108 (90 Base) MCG/ACT inhaler  amitriptyline (ELAVIL) 150 MG tablet  aspirin 81 mg chewable tablet  buPROPion (WELLBUTRIN XL) 150 MG 24 hr tablet  cholecalciferol, vitamin D3, (VITAMIN D3) 1,000 unit capsule  clonazePAM (KLONOPIN) 0.5 MG tablet  CONTOUR NEXT STRIPS Strp  dulaglutide (TRULICITY) 1.5 mg/0.5 mL PnIj  DULoxetine (CYMBALTA) 20 MG capsule  EASY TOUCH TWIST LANCETS 30 gauge Misc  eletriptan (RELPAX) 20 MG tablet  fluticasone (FLONASE) 50 mcg/actuation nasal spray  loratadine (CLARITIN) 10 mg tablet  magnesium 250 mg Tab  magnesium oxide 250 mg Tab  metFORMIN (GLUCOPHAGE-XR) 500 MG 24 hr tablet  montelukast (SINGULAIR) 10 mg tablet  orphenadrine (NORFLEX) 100 mg tablet  polyethylene glycol (GLYCOLAX) 17 gram/dose powder  senna-docusate (PERICOLACE) 8.6-50 mg tablet  spironolactone (ALDACTONE) 50 MG tablet        ALLERGIES:  Allergies   Allergen Reactions     Gluten [Gluten Meal] Other (See Comments) and Itching     Other Food Allergy Itching     peaches     Pepper (Genus Capsicum) [Piper] Other (See Comments)     Joint pain and itchy     Strawberry Itching and Other (See Comments)     Joint pain and itchy     Latex Rash       FAMILY HISTORY:  Family History   Problem Relation Age of Onset     Varicose Veins Mother        SOCIAL HISTORY:   Social History     Socioeconomic History     Marital status:    Tobacco Use     Smoking status: Never     Smokeless tobacco: Never   Substance and Sexual Activity     Alcohol use: Yes     Comment: Alcoholic Drinks/day: occasional     Drug use: No       VITALS:  /78   Pulse 66   Temp 97  F (36.1  C) (Oral)   Resp 20   Ht 1.753 m (5' 9\")   Wt 145.2 kg (320 lb)   SpO2 98%   BMI 47.26 kg/m      PHYSICAL EXAM    PHYSICAL EXAM    VITAL SIGNS: /78   Pulse 66   Temp 97  F (36.1  C) (Oral)   Resp 20   Ht 1.753 m (5' 9\")   Wt 145.2 kg (320 lb)   SpO2 98%   BMI " 47.26 kg/m    Constitutional:  Well developed, well nourished   EYES: Conjunctivae clear, no discharge  HENT: Atraumatic, normocephalic, bilateral external ears normal.  Oropharynx moist. Nose normal.  Posterior pharyngeal examination notable for symmetrical tonsillar swelling.  Mild exudate noted on both tonsils.  No significant lymphadenopathy no sublingual tenderness no anterior swelling to the neck no other abnormalities appreciated no meningeal signs clinical examination.  Neck: Normal ROM , Supple   Respiratory:  No respiratory distress, normal nonlabored respirations.   Cardiovascular:  Distal perfusion appears intact  Musculoskeletal:  No edema appreciated, Good range of motion in all major joints.   Integument:  Warm, Dry, No erythema, No rash.   Neurologic:  Alert and oriented. No focal deficits noted.  Ambulatory  Psychiatric:  Affect normal, Judgment normal, Mood normal.       LAB:  All pertinent labs reviewed and interpreted.  Results for orders placed or performed during the hospital encounter of 02/19/23   Symptomatic Influenza A/B & SARS-CoV2 (COVID-19) Virus PCR Multiplex Nasopharyngeal    Specimen: Nasopharyngeal; Swab   Result Value Ref Range    Influenza A PCR Negative Negative    Influenza B PCR Negative Negative    RSV PCR Negative Negative    SARS CoV2 PCR Negative Negative   Group A Streptococcus PCR Throat Swab    Specimen: Throat; Swab   Result Value Ref Range    Group A strep by PCR Detected (A) Not Detected     I, Roger Burton, am serving as a scribe to document services personally performed by Silvestre Blackwood, based on my observation and the provider's statements to me. I, Silvestre Blackwood, attest that Roger Burton is acting in a scribe capacity, has observed my performance of the services and has documented them in accordance with my direction.    Silvestre Blackwood MD  [facility] EMERGENCY ROOM  [address]  [address]  [phone]    Silvestre Blackwood MD  02/19/23 2223

## 2023-02-19 NOTE — Clinical Note
Danielle Grant was seen and treated in our emergency department on 2/19/2023.  She may return to work on 02/23/2023.       If you have any questions or concerns, please don't hesitate to call.      Silvestre Blackwood MD

## 2023-02-19 NOTE — Clinical Note
Dainelle Grant was seen and treated in our emergency department on 2/19/2023.  She may return to work on 02/23/2023.       If you have any questions or concerns, please don't hesitate to call.      Silvestre Blackwood MD

## 2023-06-02 ENCOUNTER — HEALTH MAINTENANCE LETTER (OUTPATIENT)
Age: 48
End: 2023-06-02

## 2023-09-04 ENCOUNTER — APPOINTMENT (OUTPATIENT)
Dept: RADIOLOGY | Facility: CLINIC | Age: 48
End: 2023-09-04
Payer: COMMERCIAL

## 2023-09-04 ENCOUNTER — HOSPITAL ENCOUNTER (EMERGENCY)
Facility: CLINIC | Age: 48
Discharge: HOME | End: 2023-09-04
Payer: COMMERCIAL

## 2023-09-04 VITALS
SYSTOLIC BLOOD PRESSURE: 136 MMHG | OXYGEN SATURATION: 96 % | TEMPERATURE: 98 F | BODY MASS INDEX: 44.41 KG/M2 | WEIGHT: 293 LBS | HEART RATE: 70 BPM | RESPIRATION RATE: 18 BRPM | HEIGHT: 68 IN | DIASTOLIC BLOOD PRESSURE: 79 MMHG

## 2023-09-04 DIAGNOSIS — M25.512 LEFT SHOULDER PAIN: ICD-10-CM

## 2023-09-04 DIAGNOSIS — S16.1XXA STRAIN OF NECK MUSCLE, INITIAL ENCOUNTER: ICD-10-CM

## 2023-09-04 LAB
ANION GAP SERPL CALCULATED.3IONS-SCNC: 11 MMOL/L (ref 5–18)
ATRIAL RATE - MUSE: 71 BPM
BUN SERPL-MCNC: 19 MG/DL (ref 8–22)
CALCIUM SERPL-MCNC: 9.6 MG/DL (ref 8.5–10.5)
CHLORIDE BLD-SCNC: 103 MMOL/L (ref 98–107)
CO2 SERPL-SCNC: 23 MMOL/L (ref 22–31)
CREAT SERPL-MCNC: 1.03 MG/DL (ref 0.6–1.1)
DIASTOLIC BLOOD PRESSURE - MUSE: NORMAL MMHG
ERYTHROCYTE [DISTWIDTH] IN BLOOD BY AUTOMATED COUNT: 15.3 % (ref 10–15)
GFR SERPL CREATININE-BSD FRML MDRD: 66 ML/MIN/1.73M2
GLUCOSE BLD-MCNC: 156 MG/DL (ref 70–125)
HCT VFR BLD AUTO: 40.5 % (ref 35–47)
HGB BLD-MCNC: 13.1 G/DL (ref 11.7–15.7)
INTERPRETATION ECG - MUSE: NORMAL
MCH RBC QN AUTO: 29.5 PG (ref 26.5–33)
MCHC RBC AUTO-ENTMCNC: 32.3 G/DL (ref 31.5–36.5)
MCV RBC AUTO: 91 FL (ref 78–100)
P AXIS - MUSE: 39 DEGREES
PLATELET # BLD AUTO: 318 10E3/UL (ref 150–450)
POTASSIUM BLD-SCNC: 4.1 MMOL/L (ref 3.5–5)
PR INTERVAL - MUSE: 182 MS
QRS DURATION - MUSE: 116 MS
QT - MUSE: 388 MS
QTC - MUSE: 421 MS
R AXIS - MUSE: 32 DEGREES
RBC # BLD AUTO: 4.44 10E6/UL (ref 3.8–5.2)
SODIUM SERPL-SCNC: 137 MMOL/L (ref 136–145)
SYSTOLIC BLOOD PRESSURE - MUSE: NORMAL MMHG
T AXIS - MUSE: 48 DEGREES
TROPONIN I SERPL-MCNC: 0.01 NG/ML (ref 0–0.29)
VENTRICULAR RATE- MUSE: 71 BPM
WBC # BLD AUTO: 11.7 10E3/UL (ref 4–11)

## 2023-09-04 PROCEDURE — 250N000013 HC RX MED GY IP 250 OP 250 PS 637

## 2023-09-04 PROCEDURE — 96372 THER/PROPH/DIAG INJ SC/IM: CPT

## 2023-09-04 PROCEDURE — 250N000011 HC RX IP 250 OP 636: Mod: JZ

## 2023-09-04 PROCEDURE — 73030 X-RAY EXAM OF SHOULDER: CPT | Mod: LT

## 2023-09-04 PROCEDURE — 84484 ASSAY OF TROPONIN QUANT: CPT

## 2023-09-04 PROCEDURE — 36415 COLL VENOUS BLD VENIPUNCTURE: CPT

## 2023-09-04 PROCEDURE — 93005 ELECTROCARDIOGRAM TRACING: CPT

## 2023-09-04 PROCEDURE — 99285 EMERGENCY DEPT VISIT HI MDM: CPT | Mod: 25

## 2023-09-04 PROCEDURE — 85027 COMPLETE CBC AUTOMATED: CPT

## 2023-09-04 PROCEDURE — 80048 BASIC METABOLIC PNL TOTAL CA: CPT

## 2023-09-04 RX ORDER — LIDOCAINE 4 G/G
1 PATCH TOPICAL EVERY 24 HOURS
Qty: 15 PATCH | Refills: 0 | Status: SHIPPED | OUTPATIENT
Start: 2023-09-04

## 2023-09-04 RX ORDER — LIDOCAINE 4 G/G
1 PATCH TOPICAL ONCE
Status: DISCONTINUED | OUTPATIENT
Start: 2023-09-04 | End: 2023-09-05 | Stop reason: HOSPADM

## 2023-09-04 RX ORDER — KETOROLAC TROMETHAMINE 15 MG/ML
15 INJECTION, SOLUTION INTRAMUSCULAR; INTRAVENOUS ONCE
Status: COMPLETED | OUTPATIENT
Start: 2023-09-04 | End: 2023-09-04

## 2023-09-04 RX ORDER — CYCLOBENZAPRINE HCL 10 MG
10 TABLET ORAL ONCE
Status: COMPLETED | OUTPATIENT
Start: 2023-09-04 | End: 2023-09-04

## 2023-09-04 RX ORDER — CYCLOBENZAPRINE HCL 10 MG
10 TABLET ORAL 3 TIMES DAILY PRN
Qty: 10 TABLET | Refills: 0 | Status: SHIPPED | OUTPATIENT
Start: 2023-09-04 | End: 2023-09-10

## 2023-09-04 RX ADMIN — KETOROLAC TROMETHAMINE 15 MG: 15 INJECTION, SOLUTION INTRAMUSCULAR; INTRAVENOUS at 20:20

## 2023-09-04 RX ADMIN — LIDOCAINE 1 PATCH: 246 PATCH TOPICAL at 20:25

## 2023-09-04 RX ADMIN — CYCLOBENZAPRINE 10 MG: 10 TABLET, FILM COATED ORAL at 20:21

## 2023-09-04 ASSESSMENT — ENCOUNTER SYMPTOMS
MYALGIAS: 1
VOMITING: 0
BACK PAIN: 0
SHORTNESS OF BREATH: 0
NAUSEA: 0

## 2023-09-04 ASSESSMENT — ACTIVITIES OF DAILY LIVING (ADL): ADLS_ACUITY_SCORE: 35

## 2023-09-04 NOTE — Clinical Note
Danielle Samano was seen and treated in our emergency department on 9/4/2023.  She may return to work on 09/07/2023.       If you have any questions or concerns, please don't hesitate to call.      Mary Najera PA-C

## 2023-09-04 NOTE — ED TRIAGE NOTES
Pt presents to the ED with c/o worsening shoulder pain. No known injury. Pt woke up with the pain. Pt states pain is much worse with movement.

## 2023-09-05 NOTE — ED PROVIDER NOTES
EMERGENCY DEPARTMENT ENCOUNTER      NAME: Danielle Samano  AGE: 48 year old female  YOB: 1975  MRN: [id number]  EVALUATION DATE & TIME: 9/4/2023  7:09 PM    PCP: Yemi Lozoya    ED PROVIDER: Mary Najera PA-C    Chief Complaint   Patient presents with    Shoulder Pain     FINAL IMPRESSION:  1. Left shoulder pain    2. Strain of neck muscle, initial encounter      MEDICAL DECISION MAKING:    Pertinent Labs & Imaging studies reviewed. (See chart for details)  Danielle Samano is a 48 year old female who presents for evaluation of left-sided shoulder pain.  Patient reports she woke up this morning with pain to her left upper back and left shoulder.  She is unsure if she slept on her arm wrong.  Pain is worse with movement touching her shoulder.  Denies neck or back pain.  Has been taking over-the-counter pain medication without relief.  Denies chest pain, shortness of breath, headache, dizziness, nausea, vomiting, abdominal pain, numbness or tingling in arm or other concerning symptoms.  Reports she is right-handed.    On my initial evaluation, vital signs normal. On physical exam patient is awake, alert, no acute distress, resting comfortably at edge of bed.  Does not appear uncomfortable, ill or toxic.  Heart sounds are normal, lungs are clear without wheezing or crackles.  She does have reproducible tenderness along her superior trapezius on the left and along her posterior shoulder along her scapula.  She has pain with range of motion both actively and passively to her left shoulder.  Able to pronate and supinate forearm without difficulty.  Normal pulses and good cap refill on the left.  No skin rashes or lesions.  No ecchymosis.  No other bony tenderness to left upper extremity or CT LS spine..    Differential diagnosis includes muscle strain, sprain, contusion, hematoma, tendon or ligamentous injury, fracture, dislocation, bursitis, tendinitis, ACS, cervicalgia, radiculopathy.  Emergency department workup included x-ray of left shoulder, basic labs, troponin and EKG. Patient was given a lidocaine pain patch, IM Toradol and Flexeril with some improvement in symptoms.    Suspect symptoms likely muscular in nature as she does have reproducible tenderness on palpation, however she does have a history of hypertension, diabetes and obesity and I had shared decision-making conversation with patient regarding obtaining further testing to eval for possible atypical chest pain or ACS.  She was agreeable to obtaining this work-up.  EKG and laboratory test independently interpreted by me.  EKG without T wave inversion, ST elevation or ST depression, troponin normal.  No chest pain or shortness of breath, very low suspicion that this is ACS or any atypical chest pain.  Her x-ray of her shoulder showed arthritis, however no fracture, dislocation or other acute pathology.  Plan to treat for muscle strain with Flexeril, Tylenol, ibuprofen, lidocaine pain patch and heat.  A sling was offered today, patient accepted this.  I encouraged her to continue to try to range his shoulder so as to prevent shoulder adhesive capsulitis.  Patient is otherwise clinically well-appearing and vitally stable, low suspicion for any emergent process that require further intervention or hospital admission.  Provided expectant management as well as strict return precautions.  Given work note.    Patient has had serial examinations and notes significant improvement.     Patient was discharged in stable condition with treatment plan as below. Instructed to follow up with primary care provider in 3 days and return to the emergency department with any new or worsening of symptoms. Patient expressed understanding, feels comfortable, and is in agreement with this plan. All questions addressed prior to discharge.    Medical Decision Making    History:  Supplemental history from: Documented in chart, if applicable  External Record(s)  reviewed: Documented in chart, if applicable.    Work Up:  Chart documentation includes differential considered and any EKGs or imaging independently interpreted by provider, where specified.  In additional to work up documented, I considered the following work up: Documented in chart, if applicable.    External consultation:  Discussion of management with another provider: Documented in chart, if applicable    Complicating factors:  Care impacted by chronic illness: Diabetes and Hypertension  Care affected by social determinants of health: Access to Medical Care    Disposition considerations: Discharge. I prescribed additional prescription strength medication(s) as charted. N/A.        ED COURSE:  7:33 PM  I reviewed the patient's chart. I met with the patient to gather history and to perform my initial exam.   9:49 PM I rechecked the patient and updated her on results. We discussed plan for discharge including treatment plan, follow-up and return precautions to emergency department.  Patient voiced understanding and in agreement with this plan.    At the conclusion of the encounter I discussed the results of all of the tests and the disposition. The questions were answered. The patient or family acknowledged understanding and was agreeable with the care plan.     Voice recognition software was used in the creation of this note. Any grammatical or nonsensical errors are due to inherent errors with the software and are not the intention of the writer.     MEDICATIONS GIVEN IN THE EMERGENCY:  Medications   Lidocaine (LIDOCARE) 4 % Patch 1 patch (1 patch Transdermal $Patch/Med Applied 9/4/23 2025)   cyclobenzaprine (FLEXERIL) tablet 10 mg (10 mg Oral $Given 9/4/23 2021)   ketorolac (TORADOL) injection 15 mg (15 mg Intramuscular $Given 9/4/23 2020)       NEW PRESCRIPTIONS STARTED AT TODAY'S ER VISIT  Discharge Medication List as of 9/4/2023 10:00 PM        START taking these medications    Details   cyclobenzaprine  (FLEXERIL) 10 MG tablet Take 1 tablet (10 mg) by mouth 3 times daily as needed for muscle spasms, Disp-10 tablet, R-0, E-Prescribe      Lidocaine (LIDOCARE) 4 % Patch Place 1 patch onto the skin every 24 hours To prevent lidocaine toxicity, patient should be patch free for 12 hrs daily.Disp-15 patch, [id number]           =================================================================    HPI:    Patient information was obtained from: The patient    Use of Interpretor: N/A     Danielle Samano is a 48 year old female with a pertinent history of type 2 diabetes mellitus, obesity, HTN, anxiety who presents to this ED via walk-in for evaluation of shoulder pain.    The patient woke up this morning with sharp left shoulder pain that radiates and extends downwards to the triceps and forearm. She is experiencing pain at rest and notes worsening pain with lifting her arm. She also has some pain to the left trapezius. She has taken over the counter medications with no significant relief. She is right handed. No recent fall or trauma. No noticeable left shoulder swelling. The patient does take medication chronically for hypertension and diabetes. Denies known injury.     Otherwise, the patient denied having nausea, vomiting, chest pain, shortness of breath, back pain, and any other medical complaints or concerns at this time.    REVIEW OF SYSTEMS:  Review of Systems   Respiratory:  Negative for shortness of breath.    Cardiovascular:  Negative for chest pain.   Gastrointestinal:  Negative for nausea and vomiting.   Musculoskeletal:  Positive for myalgias (sharp left shoulder pain that extends to tricep and forearm). Negative for back pain.   All other systems reviewed and are negative.       PAST MEDICAL HISTORY:  Past Medical History:   Diagnosis Date    Allergic rhinitis, cause unspecified     Created by Conversion     Anxiety     Asthma     Calculus of kidney     Created by Conversion     Ganglion, unspecified      Created by Conversion     Hirsutism     Created by Conversion     Insomnia, unspecified     Created by Conversion     Major depressive disorder, recurrent episode, unspecified     Created by Conversion     Migraine, unspecified, without mention of intractable migraine without mention of status migrainosus     Created by Conversion     Obesity, unspecified     Created by Conversion     Obstructive sleep apnea (adult) (pediatric)     Created by Conversion     Pain in limb     Created by Conversion     Scanty or infrequent menstruation     Created by Conversion     Seborrheic dermatitis, unspecified     Created by Conversion     Sleep apnea     Type II or unspecified type diabetes mellitus without mention of complication, not stated as uncontrolled     Created by Conversion     Unspecified essential hypertension     Created by Conversion     Unspecified vitamin D deficiency     Created by Conversion        PAST SURGICAL HISTORY:  Past Surgical History:   Procedure Laterality Date    HYSTERECTOMY Bilateral 2016    Procedure: TOTAL LAPAROSCOPIC HYSTERECTOMY BILATERAL SALPINGECTOMY AND CYSTOSCOPY;  Surgeon: Juliana Santana MD;  Location: [facility];  Service:     [facility]  DELIVERY ONLY      Description:  Section;  Recorded: 2008;    [facility] LIGATE FALLOPIAN TUBE      Description: Tubal Ligation;  Recorded: 2008;    [facility] TOTAL ABDOM HYSTERECTOMY N/A 2016    Procedure: ABDOMINAL SUBTOTAL  HYSTERECTOMY, ATTEMPTED LAPAROSCOPIC and MIRENA REMOVAL;  Surgeon: Juliana Santana MD;  Location: [facility];  Service: Gynecology       CURRENT MEDICATIONS:      Current Facility-Administered Medications:     Lidocaine (LIDOCARE) 4 % Patch 1 patch, 1 patch, Transdermal, Once, Mary Najera PA-C, 1 patch at 23    Current Outpatient Medications:     cyclobenzaprine (FLEXERIL) 10 MG tablet, Take 1 tablet (10 mg) by mouth 3 times daily as needed for muscle spasms, Disp: 10 tablet,  Rfl: 0    Lidocaine (LIDOCARE) 4 % Patch, Place 1 patch onto the skin every 24 hours To prevent lidocaine toxicity, patient should be patch free for 12 hrs daily., Disp: 15 patch, Rfl: 0    albuterol (PROAIR HFA/PROVENTIL HFA/VENTOLIN HFA) 108 (90 Base) MCG/ACT inhaler, Inhale 2 puffs into the lungs every 4 hours as needed for shortness of breath / dyspnea or wheezing, Disp: 18 g, Rfl: 0    amitriptyline (ELAVIL) 150 MG tablet, [AMITRIPTYLINE (ELAVIL) 150 MG TABLET] Take 150 mg by mouth bedtime. , Disp: , Rfl:     aspirin 81 mg chewable tablet, [ASPIRIN 81 MG CHEWABLE TABLET] Chew 81 mg daily., Disp: , Rfl:     buPROPion (WELLBUTRIN XL) 150 MG 24 hr tablet, [BUPROPION (WELLBUTRIN XL) 150 MG 24 HR TABLET] Take 150 mg by mouth., Disp: , Rfl:     cephALEXin (KEFLEX) 500 MG capsule, Take 1 capsule (500 mg) by mouth 2 times daily, Disp: 20 capsule, Rfl: 0    cholecalciferol, vitamin D3, (VITAMIN D3) 1,000 unit capsule, [CHOLECALCIFEROL, VITAMIN D3, (VITAMIN D3) 1,000 UNIT CAPSULE] Take 2,000 Units by mouth bedtime. , Disp: , Rfl:     clonazePAM (KLONOPIN) 0.5 MG tablet, [CLONAZEPAM (KLONOPIN) 0.5 MG TABLET] Take 0.5 mg by mouth., Disp: , Rfl:     CONTOUR NEXT STRIPS Strp, [CONTOUR NEXT STRIPS STRP] TEST BLOOD SUGARS 3 TIMES DAILY, Disp: 100 strip, Rfl: PRN    dulaglutide (TRULICITY) 1.5 mg/0.5 mL PnIj, [DULAGLUTIDE (TRULICITY) 1.5 MG/0.5 ML PNIJ] Inject 1.5 mg under the skin., Disp: , Rfl:     DULoxetine (CYMBALTA) 20 MG capsule, [DULOXETINE (CYMBALTA) 20 MG CAPSULE] Take 60 mg by mouth bedtime. , Disp: , Rfl:     EASY TOUCH TWIST LANCETS 30 gauge Misc, [EASY TOUCH TWIST LANCETS 30 GAUGE MISC] , Disp: , Rfl:     eletriptan (RELPAX) 20 MG tablet, [ELETRIPTAN (RELPAX) 20 MG TABLET] Take 2 tablets (40 mg total) by mouth as needed for migraine. may repeat in 2 hours if necessary, Disp: 10 tablet, Rfl: 1    fluticasone (FLONASE) 50 mcg/actuation nasal spray, [FLUTICASONE (FLONASE) 50 MCG/ACTUATION NASAL SPRAY] 1 spray into  each nostril., Disp: , Rfl:     loratadine (CLARITIN) 10 mg tablet, [LORATADINE (CLARITIN) 10 MG TABLET] Take 10 mg by mouth., Disp: , Rfl:     magnesium 250 mg Tab, [MAGNESIUM 250 MG TAB] Take 500 mg by mouth., Disp: , Rfl:     magnesium oxide 250 mg Tab, [MAGNESIUM OXIDE 250 MG TAB] Take 250 mg by mouth bedtime. , Disp: , Rfl:     metFORMIN (GLUCOPHAGE-XR) 500 MG 24 hr tablet, [METFORMIN (GLUCOPHAGE-XR) 500 MG 24 HR TABLET] Take 2,000 mg by mouth bedtime., Disp: , Rfl:     montelukast (SINGULAIR) 10 mg tablet, [MONTELUKAST (SINGULAIR) 10 MG TABLET] Take 10 mg by mouth bedtime., Disp: , Rfl:     orphenadrine (NORFLEX) 100 mg tablet, [ORPHENADRINE (NORFLEX) 100 MG TABLET] Take 100 mg by mouth bedtime as needed for muscle spasms., Disp: , Rfl:     polyethylene glycol (GLYCOLAX) 17 gram/dose powder, [POLYETHYLENE GLYCOL (GLYCOLAX) 17 GRAM/DOSE POWDER] , Disp: , Rfl:     senna-docusate (PERICOLACE) 8.6-50 mg tablet, [SENNA-DOCUSATE (PERICOLACE) 8.6-50 MG TABLET] Take 1 tablet by mouth 2 (two) times a day., Disp: 30 tablet, Rfl: 0    spironolactone (ALDACTONE) 50 MG tablet, [SPIRONOLACTONE (ALDACTONE) 50 MG TABLET] Take 50 mg by mouth., Disp: , Rfl:     ALLERGIES:  Allergies   Allergen Reactions    Gluten [Gluten Meal] Other (See Comments) and Itching    Other Food Allergy Itching     peaches    Pepper (Genus Capsicum) [Piper] Other (See Comments)     Joint pain and itchy    Strawberry Extract Itching and Other (See Comments)     Joint pain and itchy    Latex Rash       FAMILY HISTORY:  Family History   Problem Relation Age of Onset    Varicose Veins Mother        SOCIAL HISTORY:   Social History     Socioeconomic History    Marital status:    Tobacco Use    Smoking status: Never    Smokeless tobacco: Never   Substance and Sexual Activity    Alcohol use: Yes     Comment: Alcoholic Drinks/day: occasional    Drug use: No       VITALS:  Patient Vitals for the past 24 hrs:   BP Temp Temp src Pulse Resp SpO2 Height  "Weight   09/04/23 2158 136/79 -- -- 70 -- 96 % -- --   09/04/23 2031 -- -- -- -- -- 98 % -- --   09/04/23 2029 139/80 -- -- 68 -- -- -- --   09/04/23 1816 (!) 163/79 98  F (36.7  C) Temporal 77 18 98 % 1.727 m (5' 8\") (!) 157.4 kg (347 lb)       PHYSICAL EXAM    Constitutional: Well developed, Well nourished, NAD  HENT: Normocephalic, Atraumatic, Bilateral external ears normal, Oropharynx normal, mucous membranes moist, Nose normal.   Neck: Normal range of motion, No tenderness, Supple, No stridor.  Eyes: PERRL, EOMI, Conjunctiva normal, No discharge.   Respiratory: Normal breath sounds, No respiratory distress, No wheezing, Speaks full sentences easily. No cough.  Cardiovascular: Normal heart rate, Regular rhythm, No murmurs, No rubs, No gallops. Chest wall nontender.  GI: Soft, No tenderness, No masses, No flank tenderness. No rebound or guarding.  Musculoskeletal:  reproducible tenderness along her superior trapezius on the left and along her posterior shoulder along her scapula.  She has pain with range of motion both actively and passively to her left shoulder.  Able to pronate and supinate forearm without difficulty.  Normal pulses and good cap refill on the left.  No skin rashes or lesions.  No ecchymosis.  No other bony tenderness to left upper extremity2+ DP pulses. No edema. No cyanosis, No clubbing. Good range of motion in all other major joints. No tenderness to palpation or major deformities noted. No tenderness of the CTLS spine.   Integument: Warm, Dry, No erythema, No rash. No petechiae.  Neurologic: Alert & oriented x 3, Normal motor function, Normal sensory function, No focal deficits noted. Normal gait.  Psychiatric: Affect normal, Judgment normal, Mood normal. Cooperative.    LAB:  All pertinent labs reviewed and interpreted.  Labs Ordered and Resulted from Time of ED Arrival to Time of ED Departure   CBC WITH PLATELETS - Abnormal       Result Value    WBC Count 11.7 (*)     RBC Count 4.44      " Hemoglobin 13.1      Hematocrit 40.5      MCV 91      MCH 29.5      MCHC 32.3      RDW 15.3 (*)     Platelet Count 318     BASIC METABOLIC PANEL - Abnormal    Sodium 137      Potassium 4.1      Chloride 103      Carbon Dioxide (CO2) 23      Anion Gap 11      Urea Nitrogen 19      Creatinine 1.03      Calcium 9.6      Glucose 156 (*)     GFR Estimate 66     TROPONIN I - Normal    Troponin I 0.01         RADIOLOGY:  Reviewed all pertinent imaging. Please see official radiology report.  XR Shoulder Left 2 Views   Final Result   IMPRESSION: Anatomic alignment left shoulder. No acute displaced left shoulder fracture is identified. Mild left acromioclavicular joint osteoarthritis. No significant glenohumeral joint space narrowing. Visualized left lung is grossly clear.          EKG:    Performed at: 09/04/2023 at 20:15  Rate: 71 BPM   Impression: Sinus rhythm. Incomplete right bundle branch block. Borderline ECG. No significant change was found when compared with ECG of 06/14/2016 at 08:32    I have reviewed and interpreted the EKG(s) documented above along with Dr. Bob Schwarz.    PROCEDURES:   None     Diagnosis:  1. Left shoulder pain    2. Strain of neck muscle, initial encounter      IAce, am serving as a scribe to document services personally performed by Mary Najera PA-C based on my observation and the provider's statements to me. I, Mray Najera PA-C attest that Ace Valdez is acting in a scribe capacity, has observed my performance of the services and has documented them in accordance with my direction.    Mary Najera PA-C  Emergency Medicine  [facility]  9/4/2023       Mary Najera PA-C  09/04/23 5021

## 2023-09-05 NOTE — DISCHARGE INSTRUCTIONS
You were seen in the ER for left shoulder pain.  You had an x-ray done of your shoulder that showed chronic arthritis changes, but no fracture or dislocation.  Your heart markers were normal today and the rest of your blood work looks good.  This is likely a muscular pain that could be caused from a poor sleeping position or strain of your neck muscle that will get better on its own with time.  In the meantime, continue taking Tylenol and ibuprofen, you may alternate these every 3 hours for coverage.  Continue to use heating pads to the area.  Use lidocaine pain patches every 12 hours, keep current patch in place, remove for 12 hours and place a new patch in a different location, repeat as needed.  You may use Flexeril as needed for muscle spasms, this causes drowsiness, so please do not drive while taking this medication.  Use the sling provided, however as we discussed, do not keep your arm in the sling for extended periods of time as this may cause worsening shoulder pain and stiffness.  Follow-up with your primary care provider for recheck in the coming days and return to the ER if you have worsening pain, headaches, neck pain, chest pain, shortness of breath or other concerning symptoms.

## 2023-09-05 NOTE — ED NOTES
Reviewed discharge instructions with pt and spouse. Answered all questions. Please see provider's note for assessment.

## 2023-09-16 ENCOUNTER — HEALTH MAINTENANCE LETTER (OUTPATIENT)
Age: 48
End: 2023-09-16

## 2024-02-03 ENCOUNTER — HEALTH MAINTENANCE LETTER (OUTPATIENT)
Age: 49
End: 2024-02-03

## 2024-08-10 ENCOUNTER — OFFICE VISIT (OUTPATIENT)
Dept: FAMILY MEDICINE | Facility: CLINIC | Age: 49
End: 2024-08-10
Payer: COMMERCIAL

## 2024-08-10 VITALS
DIASTOLIC BLOOD PRESSURE: 71 MMHG | OXYGEN SATURATION: 95 % | TEMPERATURE: 98.6 F | HEART RATE: 84 BPM | SYSTOLIC BLOOD PRESSURE: 112 MMHG | RESPIRATION RATE: 16 BRPM

## 2024-08-10 DIAGNOSIS — U07.1 COVID-19: Primary | ICD-10-CM

## 2024-08-10 PROCEDURE — 99213 OFFICE O/P EST LOW 20 MIN: CPT

## 2024-08-10 NOTE — LETTER
August 10, 2024      Danielle Samano  [address] [facility] [address]        To Whom It May Concern:    Danielle Samano  was seen on 8/10/2024 .  Please excuse her  until 8/15/204 due to illness.        Sincerely,        Gillian Wolfe MD

## 2024-08-10 NOTE — PROGRESS NOTES
Assessment & Plan       ICD-10-CM    1. COVID-19  U07.1 nirmatrelvir and ritonavir (PAXLOVID) 300 mg/100 mg therapy pack             Pt covid positive, whole family positive, higher risk with diabetes and asthma so will do paxlovid.       Follow up with primary care provider with any problems, questions or concerns or if symptoms worsen or fail to improve. Patient agreed to plan and verbalized understanding.     Gerald Santos is a 49 year old female who presents to clinic today for the following health issues:  Chief Complaint   Patient presents with    Covid Concern     Tested positive for covid this morning. Body aches, feverish, headache, cough, congestion, sore throat, hurts to breath but denies SOB. HX of asthma.      HPI    Diabetes and asthma so higher risk. Whole family covid positive. Pt positive today. Breathing normally.     Review of Systems    10 point ROS performed and negative except as noted in HPI.     Problem List:  2016-06: S/P hysterectomy  2015-01: Right leg pain  Seborrheic Dermatitis  Ganglion  Vitamin D Deficiency  Essential hypertension  Insomnia  Allergic Rhinitis  Non-insulin dependent type 2 diabetes mellitus (H)  Migraine Headache  Obesity  Obstructive Sleep Apnea  Oligomenorrhea  Excessive Facial Or Body Hair (Hirsutism)  Nephrolithiasis  Major Depression, Recurrent  Foot Pain (Soft Tissue)  Acute blood loss anemia      Past Medical History:   Diagnosis Date    Allergic rhinitis, cause unspecified     Created by Conversion     Anxiety     Asthma     Calculus of kidney     Created by Conversion     Ganglion, unspecified     Created by Conversion     Hirsutism     Created by Conversion     Insomnia, unspecified     Created by Conversion     Major depressive disorder, recurrent episode, unspecified     Created by Conversion     Migraine, unspecified, without mention of intractable migraine without mention of status migrainosus     Created by Conversion     Obesity, unspecified      Created by Conversion     Obstructive sleep apnea (adult) (pediatric)     Created by Conversion     Pain in limb     Created by Conversion     Scanty or infrequent menstruation     Created by Conversion     Seborrheic dermatitis, unspecified     Created by Conversion     Sleep apnea     Type II or unspecified type diabetes mellitus without mention of complication, not stated as uncontrolled     Created by Conversion     Unspecified essential hypertension     Created by Conversion     Unspecified vitamin D deficiency     Created by Conversion        Social History     Tobacco Use    Smoking status: Never    Smokeless tobacco: Never   Substance Use Topics    Alcohol use: Yes     Comment: Alcoholic Drinks/day: occasional           Objective    /71   Pulse 84   Temp 98.6  F (37  C) (Oral)   Resp 16   SpO2 95%   Physical Exam   Constitutional:       General: Patient is not in acute distress.     Appearance: Normal appearance.   HENT:      Head: Normocephalic and atraumatic.      Right Ear: External ear normal.      Left Ear: External ear normal.      Nose: Some congestion, rhinorrhea.      Mouth/Throat:      Mouth: Mucous membranes are moist.      Pharynx: Oropharynx is clear, No exudate.   Eyes:      General: No scleral icterus.     Extraocular Movements: Extraocular movements intact.      Conjunctiva/sclera: Conjunctivae normal.      Pupils: Pupils are equal, round, and reactive to light.   Pulmonary:      Effort: Pulmonary effort is normal.   Cardiovascular:      Regular heart rate  Abdominal:      General: Abdomen is flat.   Musculoskeletal:         General: No swelling or deformity. Normal range of motion.      Cervical back: Normal range of motion and neck supple.   Skin:     General: Skin is warm and dry.      Coloration: Skin is not jaundiced.      Findings: No bruising, lesion or rash.   Neurological:      General: No focal deficit present.      Mental Status: Patient is alert. Mental status is at  baseline.   Psychiatric:         Mood and Affect: Mood normal.         Behavior: Behavior normal.         Thought Content: Thought content normal.       Gillian Wolfe MD

## 2024-08-25 ENCOUNTER — HEALTH MAINTENANCE LETTER (OUTPATIENT)
Age: 49
End: 2024-08-25

## 2024-11-03 ENCOUNTER — HEALTH MAINTENANCE LETTER (OUTPATIENT)
Age: 49
End: 2024-11-03

## 2025-03-02 ENCOUNTER — HEALTH MAINTENANCE LETTER (OUTPATIENT)
Age: 50
End: 2025-03-02